# Patient Record
Sex: MALE | Race: WHITE | Employment: FULL TIME | URBAN - METROPOLITAN AREA
[De-identification: names, ages, dates, MRNs, and addresses within clinical notes are randomized per-mention and may not be internally consistent; named-entity substitution may affect disease eponyms.]

---

## 2017-05-24 ENCOUNTER — APPOINTMENT (OUTPATIENT)
Dept: CT IMAGING | Age: 62
End: 2017-05-24
Payer: COMMERCIAL

## 2017-05-24 ENCOUNTER — HOSPITAL ENCOUNTER (EMERGENCY)
Age: 62
Discharge: HOME | End: 2017-05-24
Attending: EMERGENCY MEDICINE
Payer: COMMERCIAL

## 2017-05-24 VITALS
BODY MASS INDEX: 34.53 KG/M2 | RESPIRATION RATE: 17 BRPM | OXYGEN SATURATION: 95 % | TEMPERATURE: 97.6 F | WEIGHT: 220 LBS | HEART RATE: 62 BPM | HEIGHT: 67 IN | DIASTOLIC BLOOD PRESSURE: 84 MMHG | SYSTOLIC BLOOD PRESSURE: 148 MMHG

## 2017-05-24 DIAGNOSIS — R10.9 FLANK PAIN: Primary | ICD-10-CM

## 2017-05-24 LAB
ANION GAP SERPL CALCULATED.3IONS-SCNC: 16 MEQ/L (ref 7–13)
BACTERIA: NORMAL /HPF
BASOPHILS ABSOLUTE: 0 K/UL (ref 0–0.2)
BASOPHILS RELATIVE PERCENT: 0.3 %
BILIRUBIN URINE: NEGATIVE
BLOOD, URINE: ABNORMAL
BUN BLDV-MCNC: 16 MG/DL (ref 6–20)
CALCIUM SERPL-MCNC: 9 MG/DL (ref 8.6–10.2)
CHLORIDE BLD-SCNC: 97 MEQ/L (ref 98–107)
CLARITY: CLEAR
CO2: 24 MEQ/L (ref 22–29)
COLOR: YELLOW
CREAT SERPL-MCNC: 1.09 MG/DL (ref 0.7–1.2)
EOSINOPHILS ABSOLUTE: 0.1 K/UL (ref 0–0.7)
EOSINOPHILS RELATIVE PERCENT: 0.7 %
EPITHELIAL CELLS, UA: NORMAL /HPF
GFR AFRICAN AMERICAN: >60
GFR NON-AFRICAN AMERICAN: >60
GLUCOSE BLD-MCNC: 176 MG/DL (ref 74–109)
GLUCOSE URINE: 100 MG/DL
HCT VFR BLD CALC: 50.1 % (ref 42–52)
HEMOGLOBIN: 17 G/DL (ref 14–18)
KETONES, URINE: NEGATIVE MG/DL
LEUKOCYTE ESTERASE, URINE: NEGATIVE
LYMPHOCYTES ABSOLUTE: 3.6 K/UL (ref 1–4.8)
LYMPHOCYTES RELATIVE PERCENT: 23.5 %
MCH RBC QN AUTO: 29 PG (ref 27–31.3)
MCHC RBC AUTO-ENTMCNC: 34 % (ref 33–37)
MCV RBC AUTO: 85.2 FL (ref 80–100)
MONOCYTES ABSOLUTE: 1.1 K/UL (ref 0.2–0.8)
MONOCYTES RELATIVE PERCENT: 7.2 %
NEUTROPHILS ABSOLUTE: 10.4 K/UL (ref 1.4–6.5)
NEUTROPHILS RELATIVE PERCENT: 68.3 %
NITRITE, URINE: NEGATIVE
PDW BLD-RTO: 13.3 % (ref 11.5–14.5)
PH UA: 7 (ref 5–9)
PLATELET # BLD: 233 K/UL (ref 130–400)
POTASSIUM SERPL-SCNC: 3.5 MEQ/L (ref 3.5–5.1)
PROTEIN UA: NEGATIVE MG/DL
RBC # BLD: 5.88 M/UL (ref 4.7–6.1)
RBC UA: NORMAL /HPF (ref 0–2)
SODIUM BLD-SCNC: 137 MEQ/L (ref 132–144)
SPECIFIC GRAVITY UA: 1.02 (ref 1–1.03)
UROBILINOGEN, URINE: 1 E.U./DL
WBC # BLD: 15.3 K/UL (ref 4.8–10.8)
WBC UA: NORMAL /HPF (ref 0–5)

## 2017-05-24 PROCEDURE — 96374 THER/PROPH/DIAG INJ IV PUSH: CPT

## 2017-05-24 PROCEDURE — 85025 COMPLETE CBC W/AUTO DIFF WBC: CPT

## 2017-05-24 PROCEDURE — 80048 BASIC METABOLIC PNL TOTAL CA: CPT

## 2017-05-24 PROCEDURE — 81001 URINALYSIS AUTO W/SCOPE: CPT

## 2017-05-24 PROCEDURE — 74176 CT ABD & PELVIS W/O CONTRAST: CPT

## 2017-05-24 PROCEDURE — 6360000002 HC RX W HCPCS: Performed by: EMERGENCY MEDICINE

## 2017-05-24 PROCEDURE — 99284 EMERGENCY DEPT VISIT MOD MDM: CPT

## 2017-05-24 PROCEDURE — 96375 TX/PRO/DX INJ NEW DRUG ADDON: CPT

## 2017-05-24 PROCEDURE — 2580000003 HC RX 258: Performed by: EMERGENCY MEDICINE

## 2017-05-24 RX ORDER — CYCLOBENZAPRINE HCL 10 MG
10 TABLET ORAL 3 TIMES DAILY PRN
Qty: 30 TABLET | Refills: 0 | Status: SHIPPED | OUTPATIENT
Start: 2017-05-24 | End: 2017-06-03

## 2017-05-24 RX ORDER — 0.9 % SODIUM CHLORIDE 0.9 %
1000 INTRAVENOUS SOLUTION INTRAVENOUS ONCE
Status: COMPLETED | OUTPATIENT
Start: 2017-05-24 | End: 2017-05-24

## 2017-05-24 RX ORDER — HYDROCODONE BITARTRATE AND ACETAMINOPHEN 5; 325 MG/1; MG/1
1 TABLET ORAL EVERY 6 HOURS PRN
Qty: 8 TABLET | Refills: 0 | Status: SHIPPED | OUTPATIENT
Start: 2017-05-24 | End: 2017-05-31

## 2017-05-24 RX ORDER — NAPROXEN 500 MG/1
500 TABLET ORAL 2 TIMES DAILY WITH MEALS
Qty: 10 TABLET | Refills: 0 | Status: SHIPPED | OUTPATIENT
Start: 2017-05-24

## 2017-05-24 RX ORDER — KETOROLAC TROMETHAMINE 30 MG/ML
30 INJECTION, SOLUTION INTRAMUSCULAR; INTRAVENOUS ONCE
Status: COMPLETED | OUTPATIENT
Start: 2017-05-24 | End: 2017-05-24

## 2017-05-24 RX ORDER — MORPHINE SULFATE 4 MG/ML
4 INJECTION, SOLUTION INTRAMUSCULAR; INTRAVENOUS
Status: DISCONTINUED | OUTPATIENT
Start: 2017-05-24 | End: 2017-05-24 | Stop reason: HOSPADM

## 2017-05-24 RX ORDER — ONDANSETRON 2 MG/ML
4 INJECTION INTRAMUSCULAR; INTRAVENOUS ONCE
Status: COMPLETED | OUTPATIENT
Start: 2017-05-24 | End: 2017-05-24

## 2017-05-24 RX ORDER — SODIUM CHLORIDE 9 MG/ML
INJECTION, SOLUTION INTRAVENOUS CONTINUOUS
Status: DISCONTINUED | OUTPATIENT
Start: 2017-05-24 | End: 2017-05-24 | Stop reason: HOSPADM

## 2017-05-24 RX ADMIN — MORPHINE SULFATE 4 MG: 4 INJECTION, SOLUTION INTRAMUSCULAR; INTRAVENOUS at 01:13

## 2017-05-24 RX ADMIN — SODIUM CHLORIDE 1000 ML: 9 INJECTION, SOLUTION INTRAVENOUS at 01:20

## 2017-05-24 RX ADMIN — KETOROLAC TROMETHAMINE 30 MG: 30 INJECTION, SOLUTION INTRAMUSCULAR at 01:13

## 2017-05-24 RX ADMIN — ONDANSETRON 4 MG: 2 INJECTION INTRAMUSCULAR; INTRAVENOUS at 01:13

## 2017-05-24 ASSESSMENT — ENCOUNTER SYMPTOMS
SHORTNESS OF BREATH: 0
COUGH: 0
TROUBLE SWALLOWING: 0
ABDOMINAL PAIN: 1
CHEST TIGHTNESS: 0
BLOOD IN STOOL: 0
DIARRHEA: 0
EYE PAIN: 0
RHINORRHEA: 0
NAUSEA: 0
VOMITING: 0
WHEEZING: 0
BACK PAIN: 0

## 2017-05-24 ASSESSMENT — PAIN DESCRIPTION - LOCATION: LOCATION: BACK

## 2017-05-24 ASSESSMENT — PAIN DESCRIPTION - PROGRESSION: CLINICAL_PROGRESSION: GRADUALLY WORSENING

## 2017-05-24 ASSESSMENT — PAIN DESCRIPTION - ORIENTATION: ORIENTATION: LOWER;RIGHT

## 2017-05-24 ASSESSMENT — PAIN DESCRIPTION - ONSET: ONSET: GRADUAL

## 2017-05-24 ASSESSMENT — PAIN DESCRIPTION - DESCRIPTORS: DESCRIPTORS: SHARP

## 2017-05-24 ASSESSMENT — PAIN SCALES - GENERAL
PAINLEVEL_OUTOF10: 0
PAINLEVEL_OUTOF10: 0
PAINLEVEL_OUTOF10: 10

## 2017-05-24 ASSESSMENT — PAIN DESCRIPTION - FREQUENCY: FREQUENCY: CONTINUOUS

## 2017-05-24 ASSESSMENT — PAIN DESCRIPTION - PAIN TYPE: TYPE: ACUTE PAIN

## 2023-02-27 LAB
ALANINE AMINOTRANSFERASE (SGPT) (U/L) IN SER/PLAS: 34 U/L (ref 10–52)
ALBUMIN (G/DL) IN SER/PLAS: 4.1 G/DL (ref 3.4–5)
ALKALINE PHOSPHATASE (U/L) IN SER/PLAS: 62 U/L (ref 33–136)
ANION GAP IN SER/PLAS: 13 MMOL/L (ref 10–20)
ASPARTATE AMINOTRANSFERASE (SGOT) (U/L) IN SER/PLAS: 28 U/L (ref 9–39)
BILIRUBIN TOTAL (MG/DL) IN SER/PLAS: 0.7 MG/DL (ref 0–1.2)
CALCIDIOL (25 OH VITAMIN D3) (NG/ML) IN SER/PLAS: 32 NG/ML
CALCIUM (MG/DL) IN SER/PLAS: 8.9 MG/DL (ref 8.6–10.3)
CARBON DIOXIDE, TOTAL (MMOL/L) IN SER/PLAS: 31 MMOL/L (ref 21–32)
CHLORIDE (MMOL/L) IN SER/PLAS: 101 MMOL/L (ref 98–107)
COBALAMIN (VITAMIN B12) (PG/ML) IN SER/PLAS: 318 PG/ML (ref 211–911)
CREATININE (MG/DL) IN SER/PLAS: 1.08 MG/DL (ref 0.5–1.3)
ERYTHROCYTE DISTRIBUTION WIDTH (RATIO) BY AUTOMATED COUNT: 13.2 % (ref 11.5–14.5)
ERYTHROCYTE MEAN CORPUSCULAR HEMOGLOBIN CONCENTRATION (G/DL) BY AUTOMATED: 33.6 G/DL (ref 32–36)
ERYTHROCYTE MEAN CORPUSCULAR VOLUME (FL) BY AUTOMATED COUNT: 84 FL (ref 80–100)
ERYTHROCYTES (10*6/UL) IN BLOOD BY AUTOMATED COUNT: 5.58 X10E12/L (ref 4.5–5.9)
GFR MALE: 76 ML/MIN/1.73M2
GLUCOSE (MG/DL) IN SER/PLAS: 87 MG/DL (ref 74–99)
HEMATOCRIT (%) IN BLOOD BY AUTOMATED COUNT: 47 % (ref 41–52)
HEMOGLOBIN (G/DL) IN BLOOD: 15.8 G/DL (ref 13.5–17.5)
IRON (UG/DL) IN SER/PLAS: 123 UG/DL (ref 35–150)
IRON BINDING CAPACITY (UG/DL) IN SER/PLAS: 373 UG/DL (ref 240–445)
IRON SATURATION (%) IN SER/PLAS: 33 % (ref 25–45)
LEUKOCYTES (10*3/UL) IN BLOOD BY AUTOMATED COUNT: 8.1 X10E9/L (ref 4.4–11.3)
PLATELETS (10*3/UL) IN BLOOD AUTOMATED COUNT: 221 X10E9/L (ref 150–450)
POTASSIUM (MMOL/L) IN SER/PLAS: 3.7 MMOL/L (ref 3.5–5.3)
PROSTATE SPECIFIC ANTIGEN,SCREEN: 1.67 NG/ML (ref 0–4)
PROTEIN TOTAL: 6.5 G/DL (ref 6.4–8.2)
SODIUM (MMOL/L) IN SER/PLAS: 141 MMOL/L (ref 136–145)
THYROTROPIN (MIU/L) IN SER/PLAS BY DETECTION LIMIT <= 0.05 MIU/L: 2.39 MIU/L (ref 0.44–3.98)
UREA NITROGEN (MG/DL) IN SER/PLAS: 17 MG/DL (ref 6–23)

## 2023-08-19 ENCOUNTER — TELEMEDICINE (OUTPATIENT)
Dept: PRIMARY CARE | Facility: CLINIC | Age: 68
End: 2023-08-19
Payer: COMMERCIAL

## 2023-08-19 DIAGNOSIS — J01.41 ACUTE RECURRENT PANSINUSITIS: Primary | ICD-10-CM

## 2023-08-19 DIAGNOSIS — R05.1 ACUTE COUGH: ICD-10-CM

## 2023-08-19 PROBLEM — G47.33 OBSTRUCTIVE SLEEP APNEA: Status: ACTIVE | Noted: 2023-08-19

## 2023-08-19 PROBLEM — I10 BENIGN ESSENTIAL HTN: Status: ACTIVE | Noted: 2023-08-19

## 2023-08-19 PROBLEM — E78.5 HYPERLIPIDEMIA: Status: ACTIVE | Noted: 2023-08-19

## 2023-08-19 PROBLEM — K20.90 ESOPHAGITIS: Status: ACTIVE | Noted: 2023-08-19

## 2023-08-19 PROCEDURE — 99213 OFFICE O/P EST LOW 20 MIN: CPT | Performed by: NURSE PRACTITIONER

## 2023-08-19 RX ORDER — GUAIFENESIN 1200 MG/1
1200 TABLET, EXTENDED RELEASE ORAL 2 TIMES DAILY PRN
COMMUNITY
Start: 2016-01-06 | End: 2023-10-03 | Stop reason: ALTCHOICE

## 2023-08-19 RX ORDER — ATORVASTATIN CALCIUM 20 MG/1
20 TABLET, FILM COATED ORAL DAILY
COMMUNITY
Start: 2019-02-19 | End: 2023-10-03 | Stop reason: SDUPTHER

## 2023-08-19 RX ORDER — FLUTICASONE PROPIONATE 50 MCG
1 SPRAY, SUSPENSION (ML) NASAL DAILY
COMMUNITY
Start: 2020-02-14

## 2023-08-19 RX ORDER — BENZONATATE 200 MG/1
200 CAPSULE ORAL 3 TIMES DAILY PRN
Qty: 42 CAPSULE | Refills: 0 | Status: SHIPPED | OUTPATIENT
Start: 2023-08-19 | End: 2023-09-18

## 2023-08-19 RX ORDER — HYDROCHLOROTHIAZIDE 25 MG/1
25 TABLET ORAL DAILY
COMMUNITY
Start: 2021-03-16 | End: 2023-10-03 | Stop reason: SDUPTHER

## 2023-08-19 RX ORDER — AMOXICILLIN AND CLAVULANATE POTASSIUM 875; 125 MG/1; MG/1
1 TABLET, FILM COATED ORAL 2 TIMES DAILY
Qty: 14 TABLET | Refills: 0 | Status: SHIPPED | OUTPATIENT
Start: 2023-08-19 | End: 2023-08-26

## 2023-08-19 RX ORDER — PANTOPRAZOLE SODIUM 40 MG/1
40 TABLET, DELAYED RELEASE ORAL DAILY PRN
COMMUNITY
Start: 2014-05-15 | End: 2023-10-03 | Stop reason: SDUPTHER

## 2023-08-19 ASSESSMENT — ENCOUNTER SYMPTOMS
HEADACHES: 1
ARTHRALGIAS: 0
VOMITING: 0
DIAPHORESIS: 0
COUGH: 1
ACTIVITY CHANGE: 0
CHILLS: 1
FATIGUE: 1
FEVER: 0
MYALGIAS: 1
DIZZINESS: 0
NECK PAIN: 0
WHEEZING: 0
NAUSEA: 0
SINUS PAIN: 1
CHEST TIGHTNESS: 0
SORE THROAT: 0
LIGHT-HEADEDNESS: 0
ABDOMINAL PAIN: 0

## 2023-08-19 NOTE — PROGRESS NOTES
"Subjective   Patient ID: Fredy Davidson is a 67 y.o. male who presents for URI.    Sx onset 1 week ago  Sinus drainage, coughing, body aches  States he gets sinus infections 2-3 times a year  Unable to sleep at night due to coughing  Sinus pressure/pain face under eyes  OTC treatments: Zyrtec, Nyquil, Flonase and mucinex  Denies: fever (has not taken temperature)  Has busy week of work next week, \"can't be sick\"        URI   This is a new problem. The current episode started 1 to 4 weeks ago. The problem has been gradually worsening. There has been no fever. Associated symptoms include congestion, coughing, headaches and sinus pain. Pertinent negatives include no abdominal pain, chest pain, ear pain, nausea, neck pain, rash, sneezing, sore throat, vomiting or wheezing. He has tried antihistamine, decongestant and NSAIDs for the symptoms. The treatment provided mild relief.        Review of Systems   Constitutional:  Positive for chills and fatigue. Negative for activity change, diaphoresis and fever.   HENT:  Positive for congestion, postnasal drip and sinus pain. Negative for ear pain, sneezing and sore throat.    Respiratory:  Positive for cough. Negative for chest tightness and wheezing.    Cardiovascular:  Negative for chest pain.   Gastrointestinal:  Negative for abdominal pain, nausea and vomiting.   Musculoskeletal:  Positive for myalgias. Negative for arthralgias and neck pain.   Skin:  Negative for rash.   Neurological:  Positive for headaches. Negative for dizziness and light-headedness.       Objective   There were no vitals taken for this visit.    Physical Exam  Constitutional:       General: He is not in acute distress.     Appearance: Normal appearance. He is normal weight.      Comments: Unable to perform complete physical exam due to virtual visit, patient was visualized on interactive video.    Pulmonary:      Effort: Pulmonary effort is normal.   Neurological:      Mental Status: He is alert and " oriented to person, place, and time.         Assessment/Plan   Diagnoses and all orders for this visit:  Acute recurrent pansinusitis  -     amoxicillin-pot clavulanate (Augmentin) 875-125 mg tablet; Take 1 tablet (875 mg) by mouth 2 times a day for 7 days.  -     benzonatate (Tessalon) 200 mg capsule; Take 1 capsule (200 mg) by mouth 3 times a day as needed for cough. Do not crush or chew.  -Advised to continue on Zyrtec and Flonase nasal spray daily  -Complete entire coarse of antibiotics  Acute cough  -     benzonatate (Tessalon) 200 mg capsule; Take 1 capsule (200 mg) by mouth 3 times a day as needed for cough. Do not crush or chew.

## 2023-09-25 ENCOUNTER — TELEPHONE (OUTPATIENT)
Dept: PRIMARY CARE | Facility: CLINIC | Age: 68
End: 2023-09-25
Payer: COMMERCIAL

## 2023-09-25 DIAGNOSIS — R73.9 ELEVATED BLOOD SUGAR: ICD-10-CM

## 2023-09-25 DIAGNOSIS — E78.00 PURE HYPERCHOLESTEROLEMIA: Primary | ICD-10-CM

## 2023-09-26 ENCOUNTER — LAB (OUTPATIENT)
Dept: LAB | Facility: LAB | Age: 68
End: 2023-09-26
Payer: COMMERCIAL

## 2023-09-26 DIAGNOSIS — R73.9 ELEVATED BLOOD SUGAR: ICD-10-CM

## 2023-09-26 DIAGNOSIS — E78.00 PURE HYPERCHOLESTEROLEMIA: ICD-10-CM

## 2023-09-26 LAB
ALBUMIN (G/DL) IN SER/PLAS: 4.1 G/DL (ref 3.4–5)
ANION GAP IN SER/PLAS: 11 MMOL/L (ref 10–20)
CALCIUM (MG/DL) IN SER/PLAS: 8.8 MG/DL (ref 8.6–10.3)
CARBON DIOXIDE, TOTAL (MMOL/L) IN SER/PLAS: 30 MMOL/L (ref 21–32)
CHLORIDE (MMOL/L) IN SER/PLAS: 98 MMOL/L (ref 98–107)
CHOLESTEROL (MG/DL) IN SER/PLAS: 174 MG/DL (ref 0–199)
CHOLESTEROL IN HDL (MG/DL) IN SER/PLAS: 42.6 MG/DL
CHOLESTEROL/HDL RATIO: 4.1
CREATININE (MG/DL) IN SER/PLAS: 1.3 MG/DL (ref 0.5–1.3)
ESTIMATED AVERAGE GLUCOSE FOR HBA1C: 105 MG/DL
GFR MALE: 61 ML/MIN/1.73M2
GLUCOSE (MG/DL) IN SER/PLAS: 91 MG/DL (ref 74–99)
HEMOGLOBIN A1C/HEMOGLOBIN TOTAL IN BLOOD: 5.3 %
LDL: 113 MG/DL (ref 0–99)
PHOSPHATE (MG/DL) IN SER/PLAS: 3.6 MG/DL (ref 2.5–4.9)
POTASSIUM (MMOL/L) IN SER/PLAS: 3.3 MMOL/L (ref 3.5–5.3)
SODIUM (MMOL/L) IN SER/PLAS: 136 MMOL/L (ref 136–145)
TRIGLYCERIDE (MG/DL) IN SER/PLAS: 91 MG/DL (ref 0–149)
UREA NITROGEN (MG/DL) IN SER/PLAS: 15 MG/DL (ref 6–23)
VLDL: 18 MG/DL (ref 0–40)

## 2023-09-26 PROCEDURE — 80061 LIPID PANEL: CPT

## 2023-09-26 PROCEDURE — 83036 HEMOGLOBIN GLYCOSYLATED A1C: CPT

## 2023-09-26 PROCEDURE — 36415 COLL VENOUS BLD VENIPUNCTURE: CPT

## 2023-09-26 PROCEDURE — 80069 RENAL FUNCTION PANEL: CPT

## 2023-10-03 ENCOUNTER — OFFICE VISIT (OUTPATIENT)
Dept: PRIMARY CARE | Facility: CLINIC | Age: 68
End: 2023-10-03
Payer: COMMERCIAL

## 2023-10-03 VITALS
HEART RATE: 70 BPM | WEIGHT: 238 LBS | HEIGHT: 67 IN | DIASTOLIC BLOOD PRESSURE: 74 MMHG | OXYGEN SATURATION: 96 % | SYSTOLIC BLOOD PRESSURE: 137 MMHG | BODY MASS INDEX: 37.35 KG/M2

## 2023-10-03 DIAGNOSIS — E78.00 PURE HYPERCHOLESTEROLEMIA: ICD-10-CM

## 2023-10-03 DIAGNOSIS — G89.29 CHRONIC LEFT SHOULDER PAIN: ICD-10-CM

## 2023-10-03 DIAGNOSIS — G89.29 CHRONIC PAIN OF LEFT KNEE: ICD-10-CM

## 2023-10-03 DIAGNOSIS — I10 BENIGN ESSENTIAL HTN: ICD-10-CM

## 2023-10-03 DIAGNOSIS — M25.562 CHRONIC PAIN OF LEFT KNEE: ICD-10-CM

## 2023-10-03 DIAGNOSIS — G47.33 OBSTRUCTIVE SLEEP APNEA: ICD-10-CM

## 2023-10-03 DIAGNOSIS — M25.512 CHRONIC LEFT SHOULDER PAIN: ICD-10-CM

## 2023-10-03 DIAGNOSIS — Z12.5 ENCOUNTER FOR PROSTATE CANCER SCREENING: ICD-10-CM

## 2023-10-03 DIAGNOSIS — Z23 ENCOUNTER FOR IMMUNIZATION: ICD-10-CM

## 2023-10-03 DIAGNOSIS — K20.90 ESOPHAGITIS: ICD-10-CM

## 2023-10-03 DIAGNOSIS — E55.9 VITAMIN D DEFICIENCY: ICD-10-CM

## 2023-10-03 DIAGNOSIS — E66.01 CLASS 2 SEVERE OBESITY DUE TO EXCESS CALORIES WITH SERIOUS COMORBIDITY AND BODY MASS INDEX (BMI) OF 37.0 TO 37.9 IN ADULT (MULTI): ICD-10-CM

## 2023-10-03 DIAGNOSIS — Z00.00 ANNUAL PHYSICAL EXAM: Primary | ICD-10-CM

## 2023-10-03 PROCEDURE — 3008F BODY MASS INDEX DOCD: CPT | Performed by: FAMILY MEDICINE

## 2023-10-03 PROCEDURE — 99397 PER PM REEVAL EST PAT 65+ YR: CPT | Performed by: FAMILY MEDICINE

## 2023-10-03 PROCEDURE — 3078F DIAST BP <80 MM HG: CPT | Performed by: FAMILY MEDICINE

## 2023-10-03 PROCEDURE — 1036F TOBACCO NON-USER: CPT | Performed by: FAMILY MEDICINE

## 2023-10-03 PROCEDURE — 1160F RVW MEDS BY RX/DR IN RCRD: CPT | Performed by: FAMILY MEDICINE

## 2023-10-03 PROCEDURE — 1126F AMNT PAIN NOTED NONE PRSNT: CPT | Performed by: FAMILY MEDICINE

## 2023-10-03 PROCEDURE — 3075F SYST BP GE 130 - 139MM HG: CPT | Performed by: FAMILY MEDICINE

## 2023-10-03 PROCEDURE — 90662 IIV NO PRSV INCREASED AG IM: CPT | Performed by: FAMILY MEDICINE

## 2023-10-03 PROCEDURE — 90471 IMMUNIZATION ADMIN: CPT | Performed by: FAMILY MEDICINE

## 2023-10-03 PROCEDURE — 1159F MED LIST DOCD IN RCRD: CPT | Performed by: FAMILY MEDICINE

## 2023-10-03 RX ORDER — PANTOPRAZOLE SODIUM 40 MG/1
40 TABLET, DELAYED RELEASE ORAL DAILY PRN
Qty: 90 TABLET | Refills: 1 | Status: SHIPPED | OUTPATIENT
Start: 2023-10-03 | End: 2024-03-28

## 2023-10-03 RX ORDER — POTASSIUM CITRATE 10 MEQ/1
10 TABLET, EXTENDED RELEASE ORAL DAILY
Qty: 90 TABLET | Refills: 1 | Status: SHIPPED | OUTPATIENT
Start: 2023-10-03 | End: 2024-02-19 | Stop reason: WASHOUT

## 2023-10-03 RX ORDER — POTASSIUM CITRATE 10 MEQ/1
10 TABLET, EXTENDED RELEASE ORAL DAILY
COMMUNITY
Start: 2022-10-26 | End: 2023-10-03 | Stop reason: SDUPTHER

## 2023-10-03 RX ORDER — HYDROCHLOROTHIAZIDE 25 MG/1
25 TABLET ORAL DAILY
Qty: 90 TABLET | Refills: 1 | Status: SHIPPED | OUTPATIENT
Start: 2023-10-03 | End: 2024-05-28 | Stop reason: SDUPTHER

## 2023-10-03 RX ORDER — ATORVASTATIN CALCIUM 20 MG/1
20 TABLET, FILM COATED ORAL
Qty: 65 TABLET | Refills: 1 | Status: SHIPPED | OUTPATIENT
Start: 2023-10-03 | End: 2024-05-28 | Stop reason: SDUPTHER

## 2023-10-03 ASSESSMENT — COLUMBIA-SUICIDE SEVERITY RATING SCALE - C-SSRS
2. HAVE YOU ACTUALLY HAD ANY THOUGHTS OF KILLING YOURSELF?: NO
1. IN THE PAST MONTH, HAVE YOU WISHED YOU WERE DEAD OR WISHED YOU COULD GO TO SLEEP AND NOT WAKE UP?: NO

## 2023-10-03 ASSESSMENT — PATIENT HEALTH QUESTIONNAIRE - PHQ9
2. FEELING DOWN, DEPRESSED OR HOPELESS: NOT AT ALL
SUM OF ALL RESPONSES TO PHQ9 QUESTIONS 1 AND 2: 0
1. LITTLE INTEREST OR PLEASURE IN DOING THINGS: NOT AT ALL

## 2023-10-03 ASSESSMENT — ENCOUNTER SYMPTOMS
DEPRESSION: 0
OCCASIONAL FEELINGS OF UNSTEADINESS: 0
LOSS OF SENSATION IN FEET: 0

## 2023-10-03 NOTE — PROGRESS NOTES
"Subjective   Patient ID: Fredy Davidson is a 68 y.o. male who presents for Annual Exam.    Last Annual Physical: Oct 2022  Last Dental Visit: Up-to-date  Last Eye exam: Up-to-date  Hearing Concerns: Slight decrease in hearing  Diet: Well- balanced  Exercise Routine: Regular walking      HPI   The patient reports that he is taking atorvastatin for hyperlipemia however, he does not take it daily due to leg cramps as a side effect.  His blood pressures are well- controlled on the current dose of hydrochlorothiazide and is also taking pantoprazole as needed for GERD and potassium supplements for hypokalemia as prescribed. He complains of issues with his left shoulder and knee pain and is interested in getting injections.    Review of Systems  Constitutional: No fever or chills, No Night Sweats  Eyes: No Blurry Vision or Eye sight problems  ENT: No Nasal Discharge, Hoarseness, sore throat  Cardiovascular: no chest pain, no palpitations and no syncope.   Respiratory: no cough, no shortness of breath during exertion and no shortness of breath at rest.   Gastrointestinal: no abdominal pain, no nausea and no vomiting.   : No issues with urinary stream, burning with urination, no blood in urine or stools  Skin: No Skin rashes or Lesions  Neuro: No Headache, no dizziness or Numbness or tingling  Psych: No Anxiety, depression or sleeping problems  Heme: No Easy bleeding or brusing.   MSK: + left shoulder and left knee pain    Objective   /74   Pulse 70   Ht 1.702 m (5' 7\")   Wt 108 kg (238 lb)   SpO2 96%   BMI 37.28 kg/m²     Physical Exam  Constitutional: Alert and in no acute distress. Well developed, well nourished.   Head and Face: Head and face: Normal.    Eyes: Normal external exam. Pupils were equal in size, round, reactive to light (PERRL) with normal accommodation and extraocular movements intact (EOMI).   Ears, Nose, Mouth, and Throat: External inspection of ears and nose: Normal.  Hearing: Normal.  " Nasal mucosa, septum, and turbinates: Normal.  Lips, teeth, and gums: Normal.  Oropharynx: Normal.   Neck: No neck mass was observed. Supple. Thyroid not enlarged and there were no palpable thyroid nodules.   Cardiovascular: Heart rate and rhythm were normal, normal S1 and S2. Pedal pulses: Normal. No peripheral edema.   Pulmonary: No respiratory distress. Clear bilateral breath sounds.   Abdomen: Soft nontender; no abdominal mass palpated. Normal bowel sounds. No organomegaly.   Musculoskeletal: No joint swelling seen, normal movements of all extremities. Range of motion: Normal.  Muscle strength/tone: Normal.    Skin: Normal skin color and pigmentation, normal skin turgor, and no rash.   Neurologic: Deep tendon reflexes were 2+ and symmetric.   Psychiatric: Judgment and insight: Intact. Mood and affect: Normal.  Lymphatic: No cervical lymphadenopathy. Palpation of lymph nodes in axillae: Normal.  Palpation of lymph nodes in groin: Normal.    Lab Results   Component Value Date    WBC 8.1 02/27/2023    HGB 15.8 02/27/2023    HCT 47.0 02/27/2023     02/27/2023    CHOL 174 09/26/2023    TRIG 91 09/26/2023    HDL 42.6 09/26/2023    ALT 34 02/27/2023    AST 28 02/27/2023     09/26/2023    K 3.3 (L) 09/26/2023    CL 98 09/26/2023    CREATININE 1.30 09/26/2023    BUN 15 09/26/2023    CO2 30 09/26/2023    TSH 2.39 02/27/2023    HGBA1C 5.3 09/26/2023           Assessment/Plan   Diagnoses and all orders for this visit:  Annual physical exam  -     CBC; Future  -     Comprehensive Metabolic Panel; Future  -     TSH with reflex to Free T4 if abnormal; Future  Benign essential HTN  -     potassium citrate CR (Urocit-K-10) 10 mEq ER tablet; Take 1 tablet (10 mEq) by mouth once daily.  -     hydroCHLOROthiazide (HYDRODiuril) 25 mg tablet; Take 1 tablet (25 mg) by mouth once daily.  -     Albumin , Urine Random; Future  -     Follow Up In [facility] - PCP - Established; Future  Pure hypercholesterolemia  -      atorvastatin (Lipitor) 20 mg tablet; Take 1 tablet (20 mg) by mouth once daily (M-F).  -     Lipid Panel; Future  Obstructive sleep apnea  Encounter for immunization  -     Flu vaccine, quadrivalent, high-dose, preservative free, age 65y+ (FLUZONE)  Class 2 severe obesity due to excess calories with serious comorbidity and body mass index (BMI) of 37.0 to 37.9 in adult (CMS/[facility])  -     Hemoglobin A1C; Future  Esophagitis  -     pantoprazole (Protonix) 40 mg EC tablet; Take 1 tablet (40 mg) by mouth once daily as needed (heartburn).  Vitamin D deficiency  -     Vitamin B12; Future  -     Vitamin D 25-Hydroxy,Total (for eval of Vitamin D levels); Future  Encounter for prostate cancer screening  -     Prostate Specific Antigen, Screen; Future  Chronic left shoulder pain  -     XR shoulder left 2+ views; Future  Chronic pain of left knee  -     Referral to Physical Therapy; Future        Dear Fredy Davidson     It was my pleasure to take care of you today in the office. Below are the things we discussed today:    1. Immunizations: Yearly Flu shot is recommended. Given today         a: COVID: Please get booster from the pharmacy         b: Tetanus: Up-to-date         c: Shingrix: Up-to-date         d: Prevnar: Up-to-date    2. Blood Work: Reviewed  3. Seen your dentist twice a year  4. Yearly Eye exam is recommended    5. BMI: Obese  6: Diet recommendations:   Eat Clean, Try to have as many home cooked meals as possible  Avoid processed foods which contain excess calories, sugar, and sodium.    7. Exercise recommendations:   150 minutes a week to maintain your weight     If you have to loose weight, you need a better diet and exercise plan.     8. Please get your Living will / Advance directive completed if you do not have one already. Please make sure our office has a copy of the latest one.     9. Colonoscopy: Uptodate. Last done in May 2022, repeat in May 2027  10. PSA: Up-to-date    11. Hyperlipidemia: Continue  atorvastatin, take 4-5 times weekly.    12. Hypertension: Well- controlled. Continue hydrochlorothiazide.    13. GERD: Continue pantoprazole, use as needed.    14. Hypokalemia: Continue potassium supplements.    15. Left knee pain: Use over-the-counter Voltaren gel and work on losing weight. Physical therapy referral.    16. Left shoulder pain: Sports medicine referral. Left should x-ray ordered. Physical therapy referral.    Follow up in 6 months.     Follow up in one year for a Physical. Please call the office before your Physical to see if you need blood work completed prior to your physical.     Please call me if any questions arise from now until your next visit. I will call you after I am done seeing patients. A Doctor is always available by phone when the office is closed. Please feel free to call for help with any problem that you feel shouldn't wait until the office re-opens.     Scribe Attestation  By signing my name below, IYoana Scribe   attest that this documentation has been prepared under the direction and in the presence of Irina Michel MD.

## 2023-10-16 ENCOUNTER — TELEMEDICINE (OUTPATIENT)
Dept: PRIMARY CARE | Facility: CLINIC | Age: 68
End: 2023-10-16
Payer: COMMERCIAL

## 2023-10-16 DIAGNOSIS — J01.00 ACUTE NON-RECURRENT MAXILLARY SINUSITIS: Primary | ICD-10-CM

## 2023-10-16 PROCEDURE — 99213 OFFICE O/P EST LOW 20 MIN: CPT | Performed by: FAMILY MEDICINE

## 2023-10-16 RX ORDER — AMOXICILLIN AND CLAVULANATE POTASSIUM 875; 125 MG/1; MG/1
875 TABLET, FILM COATED ORAL 2 TIMES DAILY
Qty: 14 TABLET | Refills: 0 | Status: SHIPPED | OUTPATIENT
Start: 2023-10-16 | End: 2023-10-23

## 2023-10-16 NOTE — PROGRESS NOTES
Subjective   Patient ID: Fredy Davidson is a 68 y.o. male who presents for Hyperlipidemia.    HPI   The patient reports that his symptoms started 1 week ago with post- nasal drip. He has tried treating it with Tylenol, Zyrtec and Flonase nasal spray. Currently, he is experiencing a cough along with post-nasal drip. He mentions that his symptoms have been getting progressively worse.     Review of Systems:  Constitutional: No fever or chills  ENT: + post- nasal drip   Cardiovascular: no chest pain, no palpitations and no syncope.   Respiratory: + cough, no shortness of breath during exertion and no shortness of breath at rest.   Gastrointestinal: no abdominal pain, no nausea and no vomiting.  Neuro: No Headache, no dizziness    Physical Exam:   Constitutional: Alert and in no acute distress. Well developed, well nourished.   Head and Face: Head and face: Normal.     Eyes: Normal external exam.    Ears, Nose, Mouth, and Throat: External inspection of ears and nose: Normal.  Hearing: Normal.   Neck: No neck mass was observed. Supple.  Pulmonary: No respiratory distress.    Musculoskeletal: Range of motion: Normal.     Skin: Normal skin color and pigmentation, normal skin turgor, and no rash.    Neurologic: Coordination: Normal.    Psychiatric: Judgment and insight: Intact. Mood and affect: Normal.    Lab Results   Component Value Date    WBC 8.1 02/27/2023    HGB 15.8 02/27/2023    HCT 47.0 02/27/2023     02/27/2023    CHOL 174 09/26/2023    TRIG 91 09/26/2023    HDL 42.6 09/26/2023    ALT 34 02/27/2023    AST 28 02/27/2023     09/26/2023    K 3.3 (L) 09/26/2023    CL 98 09/26/2023    CREATININE 1.30 09/26/2023    BUN 15 09/26/2023    CO2 30 09/26/2023    TSH 2.39 02/27/2023    HGBA1C 5.3 09/26/2023           Assessment/Plan   Diagnoses and all orders for this visit:  Acute non-recurrent maxillary sinusitis  -     amoxicillin-pot clavulanate (Augmentin) 875-125 mg tablet; Take 1 tablet (875 mg) by mouth  2 times a day for 7 days.      1. Sinusitis: Start Augmentin. Continue Flonase nasal spray and Zyrtec.     This Medical recommendation has been made based on the Telephonic/Video conversation and the history is given by the patient, which I as a Physician and the patient also understand that there are limitations given the lack of an in-person Physical Exam. Patient will call back if symptoms don't improve.    Follow up in 6 months    Your yearly Physical is due in: Oct 2024   When you call the office for your yearly Physical, please ask them to inform me to order your blood work, so that you can get the fasting blood work before your appointment and we can discuss the results at your physical.      A Doctor is always available by phone when the office is closed. Please feel free to call for help with any problem that you feel shouldn't wait until the office re-opens.     Scribe Attestation  By signing my name below, IYoana, Scribmynor   attest that this documentation has been prepared under the direction and in the presence of Irina Michel MD.

## 2023-10-18 ENCOUNTER — PATIENT MESSAGE (OUTPATIENT)
Dept: PRIMARY CARE | Facility: CLINIC | Age: 68
End: 2023-10-18
Payer: COMMERCIAL

## 2023-10-18 DIAGNOSIS — J01.00 ACUTE NON-RECURRENT MAXILLARY SINUSITIS: Primary | ICD-10-CM

## 2023-10-18 RX ORDER — PROMETHAZINE HYDROCHLORIDE AND DEXTROMETHORPHAN HYDROBROMIDE 6.25; 15 MG/5ML; MG/5ML
5 SYRUP ORAL 4 TIMES DAILY PRN
Qty: 118 ML | Refills: 0 | Status: SHIPPED | OUTPATIENT
Start: 2023-10-18 | End: 2024-02-19 | Stop reason: WASHOUT

## 2023-11-17 ENCOUNTER — PATIENT MESSAGE (OUTPATIENT)
Dept: PRIMARY CARE | Facility: CLINIC | Age: 68
End: 2023-11-17
Payer: COMMERCIAL

## 2023-11-17 DIAGNOSIS — J01.41 ACUTE RECURRENT PANSINUSITIS: Primary | ICD-10-CM

## 2023-11-18 RX ORDER — AZITHROMYCIN 250 MG/1
TABLET, FILM COATED ORAL
Qty: 6 TABLET | Refills: 0 | Status: SHIPPED | OUTPATIENT
Start: 2023-11-18 | End: 2023-11-23

## 2024-01-10 ENCOUNTER — OFFICE VISIT (OUTPATIENT)
Dept: PRIMARY CARE | Facility: CLINIC | Age: 69
End: 2024-01-10
Payer: COMMERCIAL

## 2024-01-10 ENCOUNTER — LAB (OUTPATIENT)
Dept: LAB | Facility: LAB | Age: 69
End: 2024-01-10
Payer: COMMERCIAL

## 2024-01-10 VITALS
OXYGEN SATURATION: 96 % | BODY MASS INDEX: 37.35 KG/M2 | WEIGHT: 238 LBS | SYSTOLIC BLOOD PRESSURE: 167 MMHG | DIASTOLIC BLOOD PRESSURE: 94 MMHG | HEIGHT: 67 IN | HEART RATE: 72 BPM

## 2024-01-10 DIAGNOSIS — E78.00 PURE HYPERCHOLESTEROLEMIA: ICD-10-CM

## 2024-01-10 DIAGNOSIS — G47.33 OBSTRUCTIVE SLEEP APNEA: Primary | ICD-10-CM

## 2024-01-10 DIAGNOSIS — R06.02 SOB (SHORTNESS OF BREATH) ON EXERTION: ICD-10-CM

## 2024-01-10 DIAGNOSIS — R53.83 FATIGUE, UNSPECIFIED TYPE: ICD-10-CM

## 2024-01-10 DIAGNOSIS — I10 BENIGN ESSENTIAL HTN: ICD-10-CM

## 2024-01-10 LAB — BNP SERPL-MCNC: 5 PG/ML (ref 0–99)

## 2024-01-10 PROCEDURE — 85025 COMPLETE CBC W/AUTO DIFF WBC: CPT

## 2024-01-10 PROCEDURE — 86431 RHEUMATOID FACTOR QUANT: CPT

## 2024-01-10 PROCEDURE — 86235 NUCLEAR ANTIGEN ANTIBODY: CPT

## 2024-01-10 PROCEDURE — 3008F BODY MASS INDEX DOCD: CPT | Performed by: FAMILY MEDICINE

## 2024-01-10 PROCEDURE — 3080F DIAST BP >= 90 MM HG: CPT | Performed by: FAMILY MEDICINE

## 2024-01-10 PROCEDURE — 85652 RBC SED RATE AUTOMATED: CPT

## 2024-01-10 PROCEDURE — 86200 CCP ANTIBODY: CPT

## 2024-01-10 PROCEDURE — 99214 OFFICE O/P EST MOD 30 MIN: CPT | Performed by: FAMILY MEDICINE

## 2024-01-10 PROCEDURE — 1126F AMNT PAIN NOTED NONE PRSNT: CPT | Performed by: FAMILY MEDICINE

## 2024-01-10 PROCEDURE — 1160F RVW MEDS BY RX/DR IN RCRD: CPT | Performed by: FAMILY MEDICINE

## 2024-01-10 PROCEDURE — 86225 DNA ANTIBODY NATIVE: CPT

## 2024-01-10 PROCEDURE — 83880 ASSAY OF NATRIURETIC PEPTIDE: CPT

## 2024-01-10 PROCEDURE — 80053 COMPREHEN METABOLIC PANEL: CPT

## 2024-01-10 PROCEDURE — 86038 ANTINUCLEAR ANTIBODIES: CPT

## 2024-01-10 PROCEDURE — 36415 COLL VENOUS BLD VENIPUNCTURE: CPT

## 2024-01-10 PROCEDURE — 3077F SYST BP >= 140 MM HG: CPT | Performed by: FAMILY MEDICINE

## 2024-01-10 PROCEDURE — 1036F TOBACCO NON-USER: CPT | Performed by: FAMILY MEDICINE

## 2024-01-10 PROCEDURE — 1159F MED LIST DOCD IN RCRD: CPT | Performed by: FAMILY MEDICINE

## 2024-01-10 PROCEDURE — 86140 C-REACTIVE PROTEIN: CPT

## 2024-01-10 NOTE — PROGRESS NOTES
"Subjective   Patient ID: Margarito Valverde is a 68 y.o. male who presents for Fatigue (Stress & Anxiety).    HPI   The patient reports that his symptoms started 2 weeks ago with fatigue, snoring, shortness-of-breath and sleep issues. He states that he has a CPAP machine for his sleep apnea but does not use it regularly. He mentions that these symptoms have caused him significant anxiety.    Review of Systems  Constitutional: + fatigue. No fever or chills  Cardiovascular: no chest pain, no palpitations and no syncope.   Respiratory: + shortness of breath on exertion. no cough, no shortness of breath during exertion   Gastrointestinal: no abdominal pain, no nausea and no vomiting.  Neuro: No Headache, no dizziness  Psych: + sleep issues.    Objective   BP (!) 167/94   Pulse 72   Ht 1.702 m (5' 7\")   Wt 108 kg (238 lb)   SpO2 96%   BMI 37.28 kg/m²     Physical Exam  Constitutional: Alert and in no acute distress. Well developed, well nourished  Head and Face: Head and face: Normal.    Cardiovascular: Heart rate and rhythm were normal, normal S1 and S2. No peripheral edema.   Pulmonary: No respiratory distress. Clear bilateral breath sounds.  Musculoskeletal: Gait and station: Normal. Muscle strength/tone: Normal.   Skin: Normal skin color and pigmentation, normal skin turgor, and no rash.    Psychiatric: Judgment and insight: Intact. Mood and affect: Normal.    Lab Results   Component Value Date    WBC 8.1 02/27/2023    HGB 15.8 02/27/2023    HCT 47.0 02/27/2023     02/27/2023    CHOL 174 09/26/2023    TRIG 91 09/26/2023    HDL 42.6 09/26/2023    ALT 34 02/27/2023    AST 28 02/27/2023     09/26/2023    K 3.3 (L) 09/26/2023    CL 98 09/26/2023    CREATININE 1.30 09/26/2023    BUN 15 09/26/2023    CO2 30 09/26/2023    TSH 2.39 02/27/2023    HGBA1C 5.3 09/26/2023       CT cardiac scoring wo IV contrast  Narrative: Interpreted By:  STEVE SANTAMARIA MD  MRN: [id number]  Patient Name: MARGARITO VALVERDE   "   STUDY:  CT CARDIAC SCORING;  5/2/2023 2:32 pm     INDICATION:  Hyperlipidemia  E78.5: Hyperlipidemia.     COMPARISON:  Cardiac scoring CT dated 06/19/2017     ACCESSION NUMBER(S):  [id number]     ORDERING CLINICIAN:  GABINO BARROW     TECHNIQUE:  Using prospective ECG gating, CT scan of the coronary arteries was  performed without intravenous contrast. Coronary calcium scoring  was  performed according to the method of Agatston.     FINDINGS:  The score and distribution of calcium in the coronary arteries is as  follows:     Of note there is motion artifact on this examination, including  through the coronary arteries which may affect the scored values.     Left Main Coronary: 1.27.  Left Anterior Descending: 10.89. The score on this artery on the  prior exam was 18.54  Left Circumflex: 0.  Right Coronary Artery: 0. Of note the score of this artery was 1.47  on the prior exam. It is possible that this region is obscured on the  current exam.     Total: 12.16. The total score on the prior exam was 20.01     The lungs are clear.     The aorta is without aneurysmal dilatation evident.     The heart is not enlarged. No pericardial effusion is evident.     No hilar or mediastinal lymphadenopathy is evident.     There is a small hiatal hernia.     Impression: 1. Coronary artery calcium score of 12.16*. It is possible that this  score is not fully accurate due to patient motion on the examination.  Of note the overall risk category is the same as the prior exam.  2. Small hiatal hernia.     *Coronary artery calcium scoring may be helpful in predicting the  risk for future coronary heart disease events.  According to the  [employer] College [employer] Foundation Clinical Expert Consensus  Task Force, such testing provides important prognostic information in  patients with more than one coronary heart disease risk factor. The  coronary artery calcium score correlates with the annual risk of a  non-fatal myocardial infarction  "or coronary heart disease death.     Coronary artery score            Annual Risk     0-99                             0.4%  100-399                        1.3%  >400                            2.4%     These three \"breakpoints\" correspond to lower, intermediate and high  risk states for future coronary events.  Such information should be  used, along with appropriate clinical judgment, to make decisions  regarding the intensity of risk factor management strategies to treat  blood lipids and to modify other non-lipid coronary risk factors.     Reference: Coleman P et al. Circulation.  2007; 115:402-426      Assessment/Plan   Diagnoses and all orders for this visit:  Obstructive sleep apnea  Pure hypercholesterolemia  Benign essential HTN  SOB (shortness of breath) on exertion  -     Echocardiogram stress test; Future  -     B-Type Natriuretic Peptide; Future  Fatigue, unspecified type  -     CBC and Auto Differential; Future  -     Comprehensive Metabolic Panel; Future  -     EJFF with Reflex to EMILE; Future  -     Citrulline Antibody, IgG; Future  -     C-Reactive Protein; Future  -     Rheumatoid Factor; Future  -     Sedimentation Rate; Future        Dear Fredy Davidson     It was my pleasure to take care of you today in the office. Below are the things we discussed today:    1. Shortness-of-breath on exertion: Echocardiogram stress test ordered.    2. Fatigue: Blood work ordered.    Follow up in 3 months    Your yearly Physical is due in: Oct 2024   When you call the office for your yearly Physical, please ask them to inform me to order your blood work, so that you can get the fasting blood work before your appointment and we can discuss the results at your physical.      Please call me if any questions arise from now until your next visit. I will call you after I am done seeing patients. A Doctor is always available by phone when the office is closed. Please feel free to call for help with any problem that " you feel shouldn't wait until the office re-opens.     Scribe Attestation  By signing my name below, I, Yoana Orr, Scribmynor   attest that this documentation has been prepared under the direction and in the presence of Irina Michel MD.

## 2024-01-11 LAB
ALBUMIN SERPL BCP-MCNC: 4.5 G/DL (ref 3.4–5)
ALP SERPL-CCNC: 59 U/L (ref 33–136)
ALT SERPL W P-5'-P-CCNC: 16 U/L (ref 10–52)
ANA PATTERN: ABNORMAL
ANA SER QL HEP2 SUBST: POSITIVE
ANA TITR SER IF: ABNORMAL {TITER}
ANION GAP SERPL CALC-SCNC: 13 MMOL/L (ref 10–20)
AST SERPL W P-5'-P-CCNC: 17 U/L (ref 9–39)
BASOPHILS # BLD AUTO: 0.07 X10*3/UL (ref 0–0.1)
BASOPHILS NFR BLD AUTO: 0.9 %
BILIRUB SERPL-MCNC: 0.6 MG/DL (ref 0–1.2)
BUN SERPL-MCNC: 18 MG/DL (ref 6–23)
CALCIUM SERPL-MCNC: 9.3 MG/DL (ref 8.6–10.6)
CCP IGG SERPL-ACNC: <1 U/ML
CENTROMERE B AB SER-ACNC: <0.2 AI
CHLORIDE SERPL-SCNC: 100 MMOL/L (ref 98–107)
CHROMATIN AB SERPL-ACNC: <0.2 AI
CO2 SERPL-SCNC: 30 MMOL/L (ref 21–32)
CREAT SERPL-MCNC: 1.24 MG/DL (ref 0.5–1.3)
CRP SERPL-MCNC: 0.59 MG/DL
DSDNA AB SER-ACNC: <1 IU/ML
EGFRCR SERPLBLD CKD-EPI 2021: 64 ML/MIN/1.73M*2
ENA JO1 AB SER QL IA: <0.2 AI
ENA RNP AB SER IA-ACNC: 0.7 AI
ENA SCL70 AB SER QL IA: 3.2 AI
ENA SM AB SER IA-ACNC: <0.2 AI
ENA SM+RNP AB SER QL IA: <0.2 AI
ENA SS-A AB SER IA-ACNC: <0.2 AI
ENA SS-B AB SER IA-ACNC: <0.2 AI
EOSINOPHIL # BLD AUTO: 0.12 X10*3/UL (ref 0–0.7)
EOSINOPHIL NFR BLD AUTO: 1.5 %
ERYTHROCYTE [DISTWIDTH] IN BLOOD BY AUTOMATED COUNT: 13.9 % (ref 11.5–14.5)
ERYTHROCYTE [SEDIMENTATION RATE] IN BLOOD BY WESTERGREN METHOD: 11 MM/H (ref 0–20)
GLUCOSE SERPL-MCNC: 97 MG/DL (ref 74–99)
HCT VFR BLD AUTO: 52.3 % (ref 41–52)
HGB BLD-MCNC: 17.5 G/DL (ref 13.5–17.5)
IMM GRANULOCYTES # BLD AUTO: 0.06 X10*3/UL (ref 0–0.7)
IMM GRANULOCYTES NFR BLD AUTO: 0.7 % (ref 0–0.9)
LYMPHOCYTES # BLD AUTO: 2.37 X10*3/UL (ref 1.2–4.8)
LYMPHOCYTES NFR BLD AUTO: 29.6 %
MCH RBC QN AUTO: 27.8 PG (ref 26–34)
MCHC RBC AUTO-ENTMCNC: 33.5 G/DL (ref 32–36)
MCV RBC AUTO: 83 FL (ref 80–100)
MONOCYTES # BLD AUTO: 1.04 X10*3/UL (ref 0.1–1)
MONOCYTES NFR BLD AUTO: 13 %
NEUTROPHILS # BLD AUTO: 4.35 X10*3/UL (ref 1.2–7.7)
NEUTROPHILS NFR BLD AUTO: 54.3 %
NRBC BLD-RTO: 0 /100 WBCS (ref 0–0)
PLATELET # BLD AUTO: 236 X10*3/UL (ref 150–450)
POTASSIUM SERPL-SCNC: 3.6 MMOL/L (ref 3.5–5.3)
PROT SERPL-MCNC: 7.5 G/DL (ref 6.4–8.2)
RBC # BLD AUTO: 6.29 X10*6/UL (ref 4.5–5.9)
RHEUMATOID FACT SER NEPH-ACNC: <10 IU/ML (ref 0–15)
RIBOSOMAL P AB SER-ACNC: <0.2 AI
SODIUM SERPL-SCNC: 139 MMOL/L (ref 136–145)
WBC # BLD AUTO: 8 X10*3/UL (ref 4.4–11.3)

## 2024-02-05 ENCOUNTER — HOSPITAL ENCOUNTER (OUTPATIENT)
Dept: CARDIOLOGY | Facility: CLINIC | Age: 69
Discharge: HOME | End: 2024-02-05
Payer: COMMERCIAL

## 2024-02-05 DIAGNOSIS — R06.09 OTHER FORMS OF DYSPNEA: ICD-10-CM

## 2024-02-05 DIAGNOSIS — R06.02 SOB (SHORTNESS OF BREATH) ON EXERTION: ICD-10-CM

## 2024-02-05 PROCEDURE — 93350 STRESS TTE ONLY: CPT | Performed by: STUDENT IN AN ORGANIZED HEALTH CARE EDUCATION/TRAINING PROGRAM

## 2024-02-05 PROCEDURE — 93016 CV STRESS TEST SUPVJ ONLY: CPT | Performed by: STUDENT IN AN ORGANIZED HEALTH CARE EDUCATION/TRAINING PROGRAM

## 2024-02-05 PROCEDURE — 2500000004 HC RX 250 GENERAL PHARMACY W/ HCPCS (ALT 636 FOR OP/ED): Performed by: STUDENT IN AN ORGANIZED HEALTH CARE EDUCATION/TRAINING PROGRAM

## 2024-02-05 PROCEDURE — 93017 CV STRESS TEST TRACING ONLY: CPT

## 2024-02-05 PROCEDURE — 93018 CV STRESS TEST I&R ONLY: CPT | Performed by: STUDENT IN AN ORGANIZED HEALTH CARE EDUCATION/TRAINING PROGRAM

## 2024-02-05 RX ADMIN — PERFLUTREN 4 ML OF DILUTION: 6.52 INJECTION, SUSPENSION INTRAVENOUS at 13:30

## 2024-02-15 ENCOUNTER — TELEPHONE (OUTPATIENT)
Dept: PRIMARY CARE | Facility: CLINIC | Age: 69
End: 2024-02-15
Payer: COMMERCIAL

## 2024-02-15 DIAGNOSIS — R53.83 FATIGUE, UNSPECIFIED TYPE: Primary | ICD-10-CM

## 2024-02-19 ENCOUNTER — OFFICE VISIT (OUTPATIENT)
Dept: RHEUMATOLOGY | Facility: CLINIC | Age: 69
End: 2024-02-19
Payer: COMMERCIAL

## 2024-02-19 VITALS — SYSTOLIC BLOOD PRESSURE: 138 MMHG | DIASTOLIC BLOOD PRESSURE: 80 MMHG | WEIGHT: 242 LBS | BODY MASS INDEX: 37.9 KG/M2

## 2024-02-19 DIAGNOSIS — R76.8 ANA POSITIVE: Primary | ICD-10-CM

## 2024-02-19 DIAGNOSIS — M15.9 OSTEOARTHRITIS OF MULTIPLE JOINTS, UNSPECIFIED OSTEOARTHRITIS TYPE: ICD-10-CM

## 2024-02-19 PROCEDURE — 3008F BODY MASS INDEX DOCD: CPT | Performed by: INTERNAL MEDICINE

## 2024-02-19 PROCEDURE — 1036F TOBACCO NON-USER: CPT | Performed by: INTERNAL MEDICINE

## 2024-02-19 PROCEDURE — 1126F AMNT PAIN NOTED NONE PRSNT: CPT | Performed by: INTERNAL MEDICINE

## 2024-02-19 PROCEDURE — 99214 OFFICE O/P EST MOD 30 MIN: CPT | Performed by: INTERNAL MEDICINE

## 2024-02-19 PROCEDURE — 1159F MED LIST DOCD IN RCRD: CPT | Performed by: INTERNAL MEDICINE

## 2024-02-19 PROCEDURE — 99204 OFFICE O/P NEW MOD 45 MIN: CPT | Performed by: INTERNAL MEDICINE

## 2024-02-19 PROCEDURE — 3079F DIAST BP 80-89 MM HG: CPT | Performed by: INTERNAL MEDICINE

## 2024-02-19 PROCEDURE — 3075F SYST BP GE 130 - 139MM HG: CPT | Performed by: INTERNAL MEDICINE

## 2024-02-19 PROCEDURE — 1160F RVW MEDS BY RX/DR IN RCRD: CPT | Performed by: INTERNAL MEDICINE

## 2024-02-19 NOTE — PROGRESS NOTES
NP ref per Dr. Michel for evaluation and treatment of abnormal labs.  Labs tested due to SOB.  Cardiac work-up negetive.    Recom eval with Rheum for poss Scleroderma due to lab results.   Per patient SOB resolved.      HPI - he is here with +JEFF.  This was checked because of an episode or SOB and fatigue.  He had a stress test, and everything was fine.  He now thinks it was d/t anxiety.  His SOB has resolved.  He had no SOB.  No joint pain/swelling.  He has had knee pain in the past, now resolved.  Mild intermittent AM stiffness.  No fever, HA, mouth sores, raynauds, rash, CP, current resp, GI/dysphagia, or numbness/tingling.  +dry mouth at night - snores and mouth breathes    FH  - +arthritis.  No CTD  SH - nonsmoker.  No EtOH.  No drugs    Gen - NAD  HEENT - moist MM good salivary pooling  Neck - supple, no LAD  CV - RRR no r/m/g  Lungs - CTA  Abd - +BS  Extr - 2+ DP, PT, and rad pulses.  No c/c/e  Skin - no rash.  No tightening/thickening  Psych - nl affect  Neuro - nl strength.  Reflexes 2+ symmetric  Msk - no synovitis.  Few sm heberdons.  B knee crepitus.  NT and no pain with ROM throughout    A/P - Pt with +JEFF/Scl70 that was checked because of self-limited SOB.  Mild OA - asymptomatic  No s/s of CTD.    He does have a h/o tiny pulm nodules but has not had any recent imaging.  This is unlikely to be related.    Reeval 3 mo - if stable space visits

## 2024-03-28 DIAGNOSIS — K20.90 ESOPHAGITIS: ICD-10-CM

## 2024-03-28 RX ORDER — PANTOPRAZOLE SODIUM 40 MG/1
40 TABLET, DELAYED RELEASE ORAL DAILY PRN
Qty: 30 TABLET | Refills: 0 | Status: SHIPPED | OUTPATIENT
Start: 2024-03-28 | End: 2024-05-28 | Stop reason: SDUPTHER

## 2024-04-09 ENCOUNTER — APPOINTMENT (OUTPATIENT)
Dept: PRIMARY CARE | Facility: CLINIC | Age: 69
End: 2024-04-09
Payer: COMMERCIAL

## 2024-05-24 ENCOUNTER — PATIENT MESSAGE (OUTPATIENT)
Dept: PRIMARY CARE | Facility: CLINIC | Age: 69
End: 2024-05-24
Payer: COMMERCIAL

## 2024-05-24 DIAGNOSIS — I10 BENIGN ESSENTIAL HTN: ICD-10-CM

## 2024-05-24 DIAGNOSIS — K20.90 ESOPHAGITIS: ICD-10-CM

## 2024-05-24 DIAGNOSIS — E78.00 PURE HYPERCHOLESTEROLEMIA: ICD-10-CM

## 2024-05-28 RX ORDER — ATORVASTATIN CALCIUM 20 MG/1
20 TABLET, FILM COATED ORAL
Qty: 65 TABLET | Refills: 0 | Status: SHIPPED | OUTPATIENT
Start: 2024-05-28

## 2024-05-28 RX ORDER — PANTOPRAZOLE SODIUM 40 MG/1
40 TABLET, DELAYED RELEASE ORAL
Qty: 90 TABLET | Refills: 0 | Status: SHIPPED | OUTPATIENT
Start: 2024-05-28

## 2024-05-28 RX ORDER — HYDROCHLOROTHIAZIDE 25 MG/1
25 TABLET ORAL DAILY
Qty: 90 TABLET | Refills: 0 | Status: SHIPPED | OUTPATIENT
Start: 2024-05-28

## 2024-08-06 ENCOUNTER — LAB (OUTPATIENT)
Dept: LAB | Facility: LAB | Age: 69
End: 2024-08-06
Payer: COMMERCIAL

## 2024-08-06 DIAGNOSIS — Z00.00 ANNUAL PHYSICAL EXAM: ICD-10-CM

## 2024-08-06 DIAGNOSIS — Z12.5 ENCOUNTER FOR PROSTATE CANCER SCREENING: ICD-10-CM

## 2024-08-06 DIAGNOSIS — E66.01 CLASS 2 SEVERE OBESITY DUE TO EXCESS CALORIES WITH SERIOUS COMORBIDITY AND BODY MASS INDEX (BMI) OF 37.0 TO 37.9 IN ADULT (MULTI): ICD-10-CM

## 2024-08-06 DIAGNOSIS — E55.9 VITAMIN D DEFICIENCY: ICD-10-CM

## 2024-08-06 DIAGNOSIS — E78.00 PURE HYPERCHOLESTEROLEMIA: ICD-10-CM

## 2024-08-06 DIAGNOSIS — I10 BENIGN ESSENTIAL HTN: ICD-10-CM

## 2024-08-06 LAB
25(OH)D3 SERPL-MCNC: 28 NG/ML (ref 30–100)
ALBUMIN SERPL BCP-MCNC: 4 G/DL (ref 3.4–5)
ALP SERPL-CCNC: 67 U/L (ref 33–136)
ALT SERPL W P-5'-P-CCNC: 14 U/L (ref 10–52)
ANION GAP SERPL CALC-SCNC: 15 MMOL/L (ref 10–20)
AST SERPL W P-5'-P-CCNC: 18 U/L (ref 9–39)
BILIRUB SERPL-MCNC: 1.3 MG/DL (ref 0–1.2)
BUN SERPL-MCNC: 16 MG/DL (ref 6–23)
CALCIUM SERPL-MCNC: 8.9 MG/DL (ref 8.6–10.3)
CHLORIDE SERPL-SCNC: 94 MMOL/L (ref 98–107)
CHOLEST SERPL-MCNC: 146 MG/DL (ref 0–199)
CHOLESTEROL/HDL RATIO: 3.9
CO2 SERPL-SCNC: 32 MMOL/L (ref 21–32)
CREAT SERPL-MCNC: 1.32 MG/DL (ref 0.5–1.3)
CREAT UR-MCNC: 145.9 MG/DL (ref 20–370)
EGFRCR SERPLBLD CKD-EPI 2021: 59 ML/MIN/1.73M*2
ERYTHROCYTE [DISTWIDTH] IN BLOOD BY AUTOMATED COUNT: 13.1 % (ref 11.5–14.5)
EST. AVERAGE GLUCOSE BLD GHB EST-MCNC: 114 MG/DL
GLUCOSE SERPL-MCNC: 92 MG/DL (ref 74–99)
HBA1C MFR BLD: 5.6 %
HCT VFR BLD AUTO: 51.2 % (ref 41–52)
HDLC SERPL-MCNC: 37.5 MG/DL
HGB BLD-MCNC: 16.9 G/DL (ref 13.5–17.5)
LDLC SERPL CALC-MCNC: 74 MG/DL
MCH RBC QN AUTO: 27 PG (ref 26–34)
MCHC RBC AUTO-ENTMCNC: 33 G/DL (ref 32–36)
MCV RBC AUTO: 82 FL (ref 80–100)
MICROALBUMIN UR-MCNC: 12.5 MG/L
MICROALBUMIN/CREAT UR: 8.6 UG/MG CREAT
NON HDL CHOLESTEROL: 109 MG/DL (ref 0–149)
NRBC BLD-RTO: 0 /100 WBCS (ref 0–0)
PLATELET # BLD AUTO: 242 X10*3/UL (ref 150–450)
POTASSIUM SERPL-SCNC: 3.2 MMOL/L (ref 3.5–5.3)
PROT SERPL-MCNC: 6.6 G/DL (ref 6.4–8.2)
PSA SERPL-MCNC: 2.37 NG/ML
RBC # BLD AUTO: 6.27 X10*6/UL (ref 4.5–5.9)
SODIUM SERPL-SCNC: 138 MMOL/L (ref 136–145)
TRIGL SERPL-MCNC: 173 MG/DL (ref 0–149)
TSH SERPL-ACNC: 1.52 MIU/L (ref 0.44–3.98)
VIT B12 SERPL-MCNC: 246 PG/ML (ref 211–911)
VLDL: 35 MG/DL (ref 0–40)
WBC # BLD AUTO: 7.8 X10*3/UL (ref 4.4–11.3)

## 2024-08-06 PROCEDURE — 84153 ASSAY OF PSA TOTAL: CPT

## 2024-08-06 PROCEDURE — 85027 COMPLETE CBC AUTOMATED: CPT

## 2024-08-06 PROCEDURE — 82607 VITAMIN B-12: CPT

## 2024-08-06 PROCEDURE — 83036 HEMOGLOBIN GLYCOSYLATED A1C: CPT

## 2024-08-06 PROCEDURE — 80061 LIPID PANEL: CPT

## 2024-08-06 PROCEDURE — 84443 ASSAY THYROID STIM HORMONE: CPT

## 2024-08-06 PROCEDURE — 36415 COLL VENOUS BLD VENIPUNCTURE: CPT

## 2024-08-06 PROCEDURE — 82570 ASSAY OF URINE CREATININE: CPT

## 2024-08-06 PROCEDURE — 80053 COMPREHEN METABOLIC PANEL: CPT

## 2024-08-06 PROCEDURE — 82306 VITAMIN D 25 HYDROXY: CPT

## 2024-08-06 PROCEDURE — 82043 UR ALBUMIN QUANTITATIVE: CPT

## 2024-08-14 NOTE — PROGRESS NOTES
"Subjective   Patient ID: Fredy Davidson is a 68 y.o. male who presents for Follow-up.    HPI the patient presents today for follow-up on his hypertension and hyperlipidemia.  He has been taking his medications as prescribed.  He takes the atorvastatin 5 days a week and has if he takes more it causes cramps.  His potassium is low and he is not taking a supplement.  He was seen in an urgent care few weeks ago for your congestion and used an antihistamine which helped but now his fullness is back.    Review of Systems  Constitutional: No fever or chills  Cardiovascular: no chest pain, no palpitations and no syncope.   Respiratory: no cough, no shortness of breath during exertion and no shortness of breath at rest.   Gastrointestinal: no abdominal pain, no nausea and no vomiting.  Neuro: No Headache, no dizziness    Objective   /67   Pulse 75   Ht 1.702 m (5' 7\")   Wt 108 kg (239 lb)   BMI 37.43 kg/m²     Physical Exam  Constitutional: Alert and in no acute distress. Well developed, well nourished  Head and Face: Head and face: Normal.    Cardiovascular: Heart rate and rhythm were normal, normal S1 and S2. No peripheral edema.   Pulmonary: No respiratory distress. Clear bilateral breath sounds.  Musculoskeletal: Gait and station: Normal. Muscle strength/tone: Normal.   Skin: Normal skin color and pigmentation, normal skin turgor, and no rash.    Psychiatric: Judgment and insight: Intact. Mood and affect: Normal.    Lab Results   Component Value Date    WBC 7.8 08/06/2024    HGB 16.9 08/06/2024    HCT 51.2 08/06/2024     08/06/2024    CHOL 146 08/06/2024    TRIG 173 (H) 08/06/2024    HDL 37.5 08/06/2024    ALT 14 08/06/2024    AST 18 08/06/2024     08/06/2024    K 3.2 (L) 08/06/2024    CL 94 (L) 08/06/2024    CREATININE 1.32 (H) 08/06/2024    BUN 16 08/06/2024    CO2 32 08/06/2024    TSH 1.52 08/06/2024    HGBA1C 5.6 08/06/2024       Echocardiogram stress test          [facility], [address] [place] [id number]             Tel [phone] and Fax [phone]       Exercise Stress Echo    Patient Name:     MARGARITO VALVERDE    Ordering Provider:   [id number] GABINO CHANELL PUSHPA  Study Date:       2/5/2024            Reading Physician:   Cristina Douglas MD  MRN/PID:          [id number]            Supervising          [id number] Vincent Douglas                                        Physician:           MD  Accession#:       [id number]        Fellow:  Date of           1955 / 68      Fellow:  Birth/Age:        years  Gender:           M                   Nurse:               Sakina Keller RN  Admit Date:       2/5/2024            Technician:  Admission Status: Outpatient          Sonographer:         EJ ESTEVEZ  Height:           170.18 cm           Technologist:  Weight:           107.96 kg           Additional Staff:  BSA:              2.18 m2             Encounter#:          [id number]  BMI:              37.28 kg/m2         Patient Location:    [facility]    Study Type:    ECHOCARDIOGRAM STRESS TEST  Diagnosis/ICD: Other forms of dyspnea-R06.09  Indication:    Dyspnea on Exertion  CPT Code:      Stress Echo-72353; Stress Test Supervision-64458; Stress Test                 Interpretation-82391    Falls Risk: Low: Patient has a low risk for sustaining a fall; enviromental safety interventions in place.     Study Details: Correct procedure and correct patient verified verbally.       Patient History: Dyspnea, hypertension and hyperlipidemia. sleep apnea.  Allergies: None.  Smoker:    Never.  Diabetes:  No.  BMI:       Obese >30.       Medications: The patient's prescribed medication is potassium, protonix, hydrochlorathiazide, lipitor. The patient took medications as prescribed.     Patient Performance: The patient exercised to stage III on a Eddi protocol for 6 minutes and 31 seconds, achieving 7.7 METS. The peak heart rate  achieved was 141 bpm, which was 92 % of the age predicted target heart rate of 154 bpm. The resting blood pressure was 160/96 mmHg with a heart rate of 67 bpm. The patient's functional capacity was average. The patient developed shortness of breath during the stress exam. The symptoms resolved with rest. The blood pressure response was normal. The test was terminated due to: dyspnea, completed lab protocol and MPHR >85%. Patient has met the discharge criteria and is discharged to home.     Baseline ECG: Resting ECG showed normal sinus rhythm with normal tracing.     Stress ECG: Stress ECG showed sinus tachycardia, with occasional PVCs. No significant ST changes.     Stress Stage Data:  +---+------+-------+  HR Sys BPDias BP  +---+------+-------+  67 160   96       +---+------+-------+  [id number]   80       +---+------+-------+  [id number]   82       +---+------+-------+  141               +---+------+-------+       Recovery ECG: Recovery ECG showed normal sinus rhythm, with rare PACs and PVCs.     +------------+---+------+-------+              HR Sys BPDias BP  +------------+---+------+-------+  Recovery I  [id number]   77       +------------+---+------+-------+  Recovery II 92 191   83       +------------+---+------+-------+  Recovery III89 168   89       +------------+---+------+-------+  Recovery IV 89 144   83       +------------+---+------+-------+       Baseline Echo: Global LV systolic function is normal. The resting baseline ejection fraction was estimated at 55 to 60%. There are no regional wall motion abnormalities at baseline. Spectral Doppler shows an impaired relaxation pattern of LV diastolic filling. Normal LV filling pressures.     Stress Echo: There are no stress induced regional wall motion abnormalities. At peak stress, the global LV systolic function is increased.     Mitral Valve:  Resting  MV E Vmax:  0.67 m/s  Lateral e': 0.10 m/s  Medial e':  0.06 m/s  E/e':        6.90       Summary:   1. The resting ejection fraction was estimated at 55 to 60%.   2. Normal global left ventricular systolic function.   3. The patient's functional capacity was average. The blood pressure response was normal.   4. Spectral Doppler shows an impaired relaxation pattern of LV diastolic filling.   5. Adequate level of stress achieved.   6. No clinical, echocardiographic or electrocardiographic evidence for ischemia at a maximal workload.    [id number] Vincent Douglas MD  Electronically signed on 2/5/2024 at 2:43:03 PM            ** Final **      Assessment/Plan   Assessment & Plan  Benign essential HTN  Continue hydrochlorothiazide  Orders:    Follow Up In [facility] - PCP - Established    hydroCHLOROthiazide (HYDRODiuril) 25 mg tablet; Take 1 tablet (25 mg) by mouth once daily.    Comprehensive Metabolic Panel; Future    CBC; Future    Follow Up In [facility] - PCP - Health Maintenance; Future    Hypokalemia  I will start the patient on a potassium supplement  Orders:    potassium chloride CR (Klor-Con M20) 20 mEq ER tablet; Take 1 tablet (20 mEq) by mouth once daily. Do not crush or chew.    Esophagitis  Continue pantoprazole  Orders:    pantoprazole (ProtoNix) 40 mg EC tablet; Take 1 tablet (40 mg) by mouth once daily in the morning. Take before meals.    Pure hypercholesterolemia  Continue atorvastatin at the current dose  Orders:    atorvastatin (Lipitor) 20 mg tablet; Take 1 tablet (20 mg) by mouth once daily (M-F).    Encounter for prostate cancer screening  PSA was slightly high this time so we will recheck in 6 months  Orders:    Prostate Specific Antigen, Screen; Future    Sensation of fullness in both ears  Advised patient to use Flonase and to take Sudafed over-the-counter for 2 to 3 days but not anymore as it can raise his blood pressure.               Your yearly Physical is due in: Feb 2025  When you call the office for your yearly Physical, please ask them to  inform me to order your blood work, so that you can get the fasting blood work before your appointment and we can discuss the results at your physical.      Please call me if any questions arise from now until your next visit. I will call you after I am done seeing patients. A Doctor is always available by phone when the office is closed. Please feel free to call for help with any problem that you feel shouldn't wait until the office re-opens.     Irina Michel MD

## 2024-08-16 ENCOUNTER — APPOINTMENT (OUTPATIENT)
Dept: PRIMARY CARE | Facility: CLINIC | Age: 69
End: 2024-08-16
Payer: COMMERCIAL

## 2024-08-16 VITALS
BODY MASS INDEX: 37.51 KG/M2 | WEIGHT: 239 LBS | DIASTOLIC BLOOD PRESSURE: 67 MMHG | HEART RATE: 75 BPM | HEIGHT: 67 IN | SYSTOLIC BLOOD PRESSURE: 127 MMHG

## 2024-08-16 DIAGNOSIS — E87.6 HYPOKALEMIA: Primary | ICD-10-CM

## 2024-08-16 DIAGNOSIS — I10 BENIGN ESSENTIAL HTN: ICD-10-CM

## 2024-08-16 DIAGNOSIS — Z12.5 ENCOUNTER FOR PROSTATE CANCER SCREENING: ICD-10-CM

## 2024-08-16 DIAGNOSIS — E78.00 PURE HYPERCHOLESTEROLEMIA: ICD-10-CM

## 2024-08-16 DIAGNOSIS — K20.90 ESOPHAGITIS: ICD-10-CM

## 2024-08-16 DIAGNOSIS — H93.8X3 SENSATION OF FULLNESS IN BOTH EARS: ICD-10-CM

## 2024-08-16 PROCEDURE — 1036F TOBACCO NON-USER: CPT | Performed by: FAMILY MEDICINE

## 2024-08-16 PROCEDURE — 3074F SYST BP LT 130 MM HG: CPT | Performed by: FAMILY MEDICINE

## 2024-08-16 PROCEDURE — 3078F DIAST BP <80 MM HG: CPT | Performed by: FAMILY MEDICINE

## 2024-08-16 PROCEDURE — 1160F RVW MEDS BY RX/DR IN RCRD: CPT | Performed by: FAMILY MEDICINE

## 2024-08-16 PROCEDURE — 1159F MED LIST DOCD IN RCRD: CPT | Performed by: FAMILY MEDICINE

## 2024-08-16 PROCEDURE — 99214 OFFICE O/P EST MOD 30 MIN: CPT | Performed by: FAMILY MEDICINE

## 2024-08-16 PROCEDURE — 1123F ACP DISCUSS/DSCN MKR DOCD: CPT | Performed by: FAMILY MEDICINE

## 2024-08-16 PROCEDURE — 3008F BODY MASS INDEX DOCD: CPT | Performed by: FAMILY MEDICINE

## 2024-08-16 RX ORDER — PANTOPRAZOLE SODIUM 40 MG/1
40 TABLET, DELAYED RELEASE ORAL
Qty: 90 TABLET | Refills: 1 | Status: SHIPPED | OUTPATIENT
Start: 2024-08-16

## 2024-08-16 RX ORDER — POTASSIUM CHLORIDE 20 MEQ/1
20 TABLET, EXTENDED RELEASE ORAL DAILY
Qty: 90 TABLET | Refills: 1 | Status: SHIPPED | OUTPATIENT
Start: 2024-08-16

## 2024-08-16 RX ORDER — HYDROCHLOROTHIAZIDE 25 MG/1
25 TABLET ORAL DAILY
Qty: 90 TABLET | Refills: 1 | Status: SHIPPED | OUTPATIENT
Start: 2024-08-16

## 2024-08-16 RX ORDER — ATORVASTATIN CALCIUM 20 MG/1
20 TABLET, FILM COATED ORAL
Qty: 65 TABLET | Refills: 1 | Status: SHIPPED | OUTPATIENT
Start: 2024-08-16

## 2024-08-16 NOTE — ASSESSMENT & PLAN NOTE
Continue hydrochlorothiazide  Orders:    Follow Up In [facility] - PCP - Established    hydroCHLOROthiazide (HYDRODiuril) 25 mg tablet; Take 1 tablet (25 mg) by mouth once daily.    Comprehensive Metabolic Panel; Future    CBC; Future    Follow Up In [facility] - PCP - Health Maintenance; Future

## 2024-08-16 NOTE — ASSESSMENT & PLAN NOTE
Continue atorvastatin at the current dose  Orders:    atorvastatin (Lipitor) 20 mg tablet; Take 1 tablet (20 mg) by mouth once daily (M-F).

## 2024-08-16 NOTE — ASSESSMENT & PLAN NOTE
Continue pantoprazole  Orders:    pantoprazole (ProtoNix) 40 mg EC tablet; Take 1 tablet (40 mg) by mouth once daily in the morning. Take before meals.

## 2024-08-30 ENCOUNTER — PATIENT MESSAGE (OUTPATIENT)
Dept: PRIMARY CARE | Facility: CLINIC | Age: 69
End: 2024-08-30
Payer: COMMERCIAL

## 2024-08-30 DIAGNOSIS — R05.1 ACUTE COUGH: Primary | ICD-10-CM

## 2024-09-01 RX ORDER — PROMETHAZINE HYDROCHLORIDE AND DEXTROMETHORPHAN HYDROBROMIDE 6.25; 15 MG/5ML; MG/5ML
5 SYRUP ORAL EVERY 4 HOURS PRN
Qty: 120 ML | Refills: 0 | Status: SHIPPED | OUTPATIENT
Start: 2024-09-01

## 2024-09-01 RX ORDER — AZITHROMYCIN 250 MG/1
TABLET, FILM COATED ORAL
Qty: 6 TABLET | Refills: 0 | Status: SHIPPED | OUTPATIENT
Start: 2024-09-01 | End: 2024-09-06

## 2024-09-05 DIAGNOSIS — R05.1 ACUTE COUGH: Primary | ICD-10-CM

## 2024-09-05 RX ORDER — ALBUTEROL SULFATE 90 UG/1
2 INHALANT RESPIRATORY (INHALATION) EVERY 4 HOURS PRN
Qty: 8.5 G | Refills: 0 | Status: SHIPPED | OUTPATIENT
Start: 2024-09-05 | End: 2025-09-05

## 2024-09-06 RX ORDER — AMOXICILLIN AND CLAVULANATE POTASSIUM 875; 125 MG/1; MG/1
875 TABLET, FILM COATED ORAL 2 TIMES DAILY
Qty: 14 TABLET | Refills: 0 | Status: SHIPPED | OUTPATIENT
Start: 2024-09-06 | End: 2024-09-13

## 2024-09-06 RX ORDER — PREDNISONE 5 MG/1
TABLET ORAL
Qty: 30 TABLET | Refills: 0 | Status: SHIPPED | OUTPATIENT
Start: 2024-09-06

## 2024-12-31 ENCOUNTER — OFFICE VISIT (OUTPATIENT)
Dept: URGENT CARE | Age: 69
End: 2024-12-31
Payer: COMMERCIAL

## 2024-12-31 VITALS
WEIGHT: 241.4 LBS | DIASTOLIC BLOOD PRESSURE: 100 MMHG | TEMPERATURE: 98.2 F | HEIGHT: 68 IN | HEART RATE: 75 BPM | OXYGEN SATURATION: 95 % | BODY MASS INDEX: 36.59 KG/M2 | SYSTOLIC BLOOD PRESSURE: 157 MMHG

## 2024-12-31 DIAGNOSIS — H65.00 ACUTE SEROUS OTITIS MEDIA, RECURRENCE NOT SPECIFIED, UNSPECIFIED LATERALITY: Primary | ICD-10-CM

## 2024-12-31 PROCEDURE — 1123F ACP DISCUSS/DSCN MKR DOCD: CPT | Performed by: NURSE PRACTITIONER

## 2024-12-31 PROCEDURE — 3080F DIAST BP >= 90 MM HG: CPT | Performed by: NURSE PRACTITIONER

## 2024-12-31 PROCEDURE — 1036F TOBACCO NON-USER: CPT | Performed by: NURSE PRACTITIONER

## 2024-12-31 PROCEDURE — 1159F MED LIST DOCD IN RCRD: CPT | Performed by: NURSE PRACTITIONER

## 2024-12-31 PROCEDURE — 99213 OFFICE O/P EST LOW 20 MIN: CPT | Performed by: NURSE PRACTITIONER

## 2024-12-31 PROCEDURE — 3077F SYST BP >= 140 MM HG: CPT | Performed by: NURSE PRACTITIONER

## 2024-12-31 PROCEDURE — 3008F BODY MASS INDEX DOCD: CPT | Performed by: NURSE PRACTITIONER

## 2024-12-31 PROCEDURE — 1125F AMNT PAIN NOTED PAIN PRSNT: CPT | Performed by: NURSE PRACTITIONER

## 2024-12-31 RX ORDER — AMOXICILLIN AND CLAVULANATE POTASSIUM 875; 125 MG/1; MG/1
1 TABLET, FILM COATED ORAL 2 TIMES DAILY
Qty: 20 TABLET | Refills: 0 | Status: SHIPPED | OUTPATIENT
Start: 2024-12-31 | End: 2025-01-10

## 2024-12-31 ASSESSMENT — ENCOUNTER SYMPTOMS
ENDOCRINE NEGATIVE: 1
SINUS PAIN: 1
EYES NEGATIVE: 1
GASTROINTESTINAL NEGATIVE: 1
HEMATOLOGIC/LYMPHATIC NEGATIVE: 1
CARDIOVASCULAR NEGATIVE: 1
MUSCULOSKELETAL NEGATIVE: 1
RHINORRHEA: 1
SINUS PRESSURE: 1
ALLERGIC/IMMUNOLOGIC NEGATIVE: 1
COUGH: 1
NEUROLOGICAL NEGATIVE: 1
FATIGUE: 1
PSYCHIATRIC NEGATIVE: 1

## 2024-12-31 ASSESSMENT — PAIN SCALES - GENERAL: PAINLEVEL_OUTOF10: 4

## 2024-12-31 NOTE — PROGRESS NOTES
Subjective   Patient ID: Fredy Davidson is a 69 y.o. male. They present today with a chief complaint of Earache (Bilat, left hurts worse than right. Since Saturday night) and Sinus Drainage (For 1 week).    History of Present Illness    History provided by:  Patient   used: No    Earache   There is pain in both ears. The current episode started in the past 7 days. The problem has been gradually worsening. The fever has been present for Less than 1 day. Associated symptoms include coughing and rhinorrhea. He has tried acetaminophen for the symptoms. The treatment provided no relief.       Past Medical History  Allergies as of 12/31/2024    (No Known Allergies)       (Not in a hospital admission)       Past Medical History:   Diagnosis Date    Calculus of gallbladder without cholecystitis without obstruction 12/19/2017    Symptomatic cholelithiasis    Calculus of gallbladder without cholecystitis without obstruction     Gallstones    Chronic kidney disease, stage 3 unspecified (Multi) 05/10/2019    CKD (chronic kidney disease), stage III    COVID-19 06/14/2022    COVID-19 virus infection    Infectious gastroenteritis and colitis, unspecified 03/19/2015    Infectious gastroenteritis    Localized swelling, mass and lump, unspecified upper limb 12/02/2016    Forearm mass    Low back pain, unspecified 12/02/2019    Right low back pain    Morbid (severe) obesity due to excess calories (Multi) 04/19/2022    Class 2 severe obesity with serious comorbidity and body mass index (BMI) of 37.0 to 37.9 in adult    Other conditions influencing health status 12/05/2018    History of cough    Other specified disorders of kidney and ureter 06/10/2019    Renal mass, left    Other specified soft tissue disorders 12/12/2016    Soft tissue mass    Personal history of diseases of the blood and blood-forming organs and certain disorders involving the immune mechanism 06/29/2018    History of leukocytosis    Personal  "history of other diseases of the nervous system and sense organs 03/27/2015    History of sleep apnea    Personal history of other diseases of the respiratory system 02/14/2020    History of sinusitis    Personal history of other specified conditions 05/30/2017    History of fatigue    Right upper quadrant pain 12/14/2017    Abdominal pain, acute, right upper quadrant       Past Surgical History:   Procedure Laterality Date    APPENDECTOMY  03/10/2014    Appendectomy    COLONOSCOPY  12/12/2016    Complete Colonoscopy    OTHER SURGICAL HISTORY  09/24/2020    Cholecystectomy        reports that he has never smoked. He has never used smokeless tobacco. He reports that he does not currently use alcohol. He reports that he does not use drugs.    Review of Systems  Review of Systems   Constitutional:  Positive for fatigue.   HENT:  Positive for ear pain, rhinorrhea, sinus pressure and sinus pain.    Eyes: Negative.    Respiratory:  Positive for cough.    Cardiovascular: Negative.    Gastrointestinal: Negative.    Endocrine: Negative.    Genitourinary: Negative.    Musculoskeletal: Negative.    Skin: Negative.    Allergic/Immunologic: Negative.    Neurological: Negative.    Hematological: Negative.    Psychiatric/Behavioral: Negative.                                    Objective    Vitals:    12/31/24 1414   BP: (!) 157/100   Pulse: 75   Temp: 36.8 °C (98.2 °F)   SpO2: 95%   Weight: 110 kg (241 lb 6.5 oz)   Height: 1.715 m (5' 7.5\")     No LMP for male patient.    Physical Exam  Vitals and nursing note reviewed.   Constitutional:       Appearance: Normal appearance. He is normal weight.   HENT:      Right Ear: Tympanic membrane is erythematous.      Left Ear: Tympanic membrane is erythematous.      Nose: Nasal tenderness, mucosal edema, congestion and rhinorrhea present. Rhinorrhea is purulent.      Right Sinus: Maxillary sinus tenderness present.      Left Sinus: Maxillary sinus tenderness present.   Cardiovascular:      " Rate and Rhythm: Normal rate and regular rhythm.      Pulses: Normal pulses.      Heart sounds: Normal heart sounds.   Pulmonary:      Effort: Pulmonary effort is normal.      Breath sounds: Normal breath sounds.   Abdominal:      General: Bowel sounds are normal.      Palpations: Abdomen is soft.   Musculoskeletal:      Cervical back: Normal range of motion and neck supple.   Skin:     General: Skin is warm and dry.   Neurological:      General: No focal deficit present.      Mental Status: He is alert and oriented to person, place, and time. Mental status is at baseline.   Psychiatric:         Mood and Affect: Mood normal.         Behavior: Behavior normal.         Thought Content: Thought content normal.         Judgment: Judgment normal.         Procedures    Point of Care Test & Imaging Results from this visit  No results found for this visit on 12/31/24.   No results found.    Diagnostic study results (if any) were reviewed by CASSIA Santamaria.    Assessment/Plan   Allergies, medications, history, and pertinent labs/EKGs/Imaging reviewed by CASSIA Santamaria.     Medical Decision Making  Medical Decision Making  At time of discharge patient was clinically well-appearing and HDS for outpatient management. The patient and/or family was educated regarding diagnosis, supportive care, OTC and Rx medications. The patient and/or family was given the opportunity to ask questions prior to discharge.  They verbalized understanding of my discussion of the plans for treatment, expected course, indications to return to  or seek further evaluation in ED, and the need for timely follow up as directed.   They were provided with a work/school excuse if requested.        Orders and Diagnoses  Diagnoses and all orders for this visit:  Acute serous otitis media, recurrence not specified, unspecified laterality  -     amoxicillin-pot clavulanate (Augmentin) 875-125 mg tablet; Take 1 tablet by mouth 2 times a  day for 10 days.    Return to Urgent care if symptoms return or progress  Follow up with PCP in 1-2 weeks     Patient disposition: Home    Electronically signed by CASSIA Santamaria  2:53 PM

## 2025-01-13 ENCOUNTER — OFFICE VISIT (OUTPATIENT)
Dept: URGENT CARE | Age: 70
End: 2025-01-13
Payer: COMMERCIAL

## 2025-01-13 VITALS
TEMPERATURE: 97.5 F | BODY MASS INDEX: 36.69 KG/M2 | OXYGEN SATURATION: 97 % | HEART RATE: 69 BPM | SYSTOLIC BLOOD PRESSURE: 156 MMHG | HEIGHT: 68 IN | DIASTOLIC BLOOD PRESSURE: 89 MMHG | WEIGHT: 242.06 LBS | RESPIRATION RATE: 18 BRPM

## 2025-01-13 DIAGNOSIS — H69.93 DYSFUNCTION OF BOTH EUSTACHIAN TUBES: Primary | ICD-10-CM

## 2025-01-13 RX ORDER — METHYLPREDNISOLONE 4 MG/1
TABLET ORAL
Qty: 21 TABLET | Refills: 0 | Status: SHIPPED | OUTPATIENT
Start: 2025-01-13 | End: 2025-01-19

## 2025-01-13 NOTE — PROGRESS NOTES
Subjective   Patient ID: Fredy Davidson is a 69 y.o. male. They present today with a chief complaint of Follow-up (PT states he was here last week for ear infection, not as bad but not completely healed. 2 AUGMENTIN tablets left. //Wants to ask about ear drops).    History of Present Illness  See mdm       History provided by:  Patient   used: No        Past Medical History  Allergies as of 01/13/2025    (No Known Allergies)       (Not in a hospital admission)       Past Medical History:   Diagnosis Date    Calculus of gallbladder without cholecystitis without obstruction 12/19/2017    Symptomatic cholelithiasis    Calculus of gallbladder without cholecystitis without obstruction     Gallstones    Chronic kidney disease, stage 3 unspecified (Multi) 05/10/2019    CKD (chronic kidney disease), stage III    COVID-19 06/14/2022    COVID-19 virus infection    Infectious gastroenteritis and colitis, unspecified 03/19/2015    Infectious gastroenteritis    Localized swelling, mass and lump, unspecified upper limb 12/02/2016    Forearm mass    Low back pain, unspecified 12/02/2019    Right low back pain    Morbid (severe) obesity due to excess calories (Multi) 04/19/2022    Class 2 severe obesity with serious comorbidity and body mass index (BMI) of 37.0 to 37.9 in adult    Other conditions influencing health status 12/05/2018    History of cough    Other specified disorders of kidney and ureter 06/10/2019    Renal mass, left    Other specified soft tissue disorders 12/12/2016    Soft tissue mass    Personal history of diseases of the blood and blood-forming organs and certain disorders involving the immune mechanism 06/29/2018    History of leukocytosis    Personal history of other diseases of the nervous system and sense organs 03/27/2015    History of sleep apnea    Personal history of other diseases of the respiratory system 02/14/2020    History of sinusitis    Personal history of other specified  "conditions 05/30/2017    History of fatigue    Right upper quadrant pain 12/14/2017    Abdominal pain, acute, right upper quadrant       Past Surgical History:   Procedure Laterality Date    APPENDECTOMY  03/10/2014    Appendectomy    COLONOSCOPY  12/12/2016    Complete Colonoscopy    OTHER SURGICAL HISTORY  09/24/2020    Cholecystectomy        reports that he has never smoked. He has never used smokeless tobacco. He reports that he does not currently use alcohol. He reports that he does not use drugs.    Review of Systems  Review of Systems   All other systems reviewed and are negative.                                 Objective    Vitals:    01/13/25 1324   BP: 156/89   Pulse: 69   Resp: 18   Temp: 36.4 °C (97.5 °F)   TempSrc: Oral   SpO2: 97%   Weight: 110 kg (242 lb 1 oz)   Height: 1.715 m (5' 7.5\")     No LMP for male patient.    Physical Exam  Vitals and nursing note reviewed.   Constitutional:       General: He is not in acute distress.     Appearance: He is normal weight. He is not ill-appearing, toxic-appearing or diaphoretic.   HENT:      Head: Normocephalic and atraumatic.      Right Ear: Tympanic membrane, ear canal and external ear normal.      Left Ear: Tympanic membrane, ear canal and external ear normal.      Nose: Nose normal.      Mouth/Throat:      Mouth: Mucous membranes are moist.      Pharynx: No oropharyngeal exudate or posterior oropharyngeal erythema.   Eyes:      General: No scleral icterus.        Right eye: No discharge.         Left eye: No discharge.      Extraocular Movements: Extraocular movements intact.      Conjunctiva/sclera: Conjunctivae normal.      Pupils: Pupils are equal, round, and reactive to light.   Cardiovascular:      Rate and Rhythm: Normal rate and regular rhythm.      Pulses: Normal pulses.      Heart sounds: No murmur heard.     No friction rub. No gallop.   Pulmonary:      Effort: Pulmonary effort is normal. No respiratory distress.      Breath sounds: No stridor. " No wheezing, rhonchi or rales.   Abdominal:      General: Abdomen is flat.      Tenderness: There is no abdominal tenderness. There is no guarding or rebound.   Musculoskeletal:      Cervical back: Normal range of motion and neck supple. No rigidity or tenderness.      Right lower leg: No edema.      Left lower leg: No edema.   Skin:     General: Skin is warm and dry.      Capillary Refill: Capillary refill takes less than 2 seconds.   Neurological:      General: No focal deficit present.      Mental Status: He is alert.   Psychiatric:         Mood and Affect: Mood normal.         Behavior: Behavior normal.         Procedures    Point of Care Test & Imaging Results from this visit  No results found for this visit on 01/13/25.   No results found.    Diagnostic study results (if any) were reviewed by Val Yip PA-C.    Assessment/Plan   Allergies, medications, history, and pertinent labs/EKGs/Imaging reviewed by Val Yip PA-C.     Medical Decision Making  69-year-old male presents with complaint of bilateral otalgia.  Patient reports he was treated for right otitis media with Augmentin.  He states that his symptoms have improved somewhat however he is still having persistent significant ear pain.  He is using Flonase every morning and he does have some chronic sinusitis.  He normally takes Zyrtec but had stopped that while taking antibiotics.  Also stopped his antihypertensive, was not sure if okay to take with antibiotics.  Ear exam is normal.  No features of acute otitis media, malignant otitis media or externa, TM perforation, cerumen impaction, mastoiditis.  Consider eustachian tube dysfunction as he states he does have some sinusitis and popping of his ears which has been worse since flying over [place].  Will treat with steroids for this reason.  Follow-up with primary care provider.  Advised him to restart his Zyrtec and antihypertensive particularly while taking steroids.  Discussed that  steroids can increase BP, monitor.   Encouraged follow-up with primary care provider.  Discussed indications for presentation to emergency department.  Discharged good condition agreeable to plan as discussed.     Orders and Diagnoses  Diagnoses and all orders for this visit:  Dysfunction of both eustachian tubes  -     methylPREDNISolone (Medrol Dospak) 4 mg tablets; Follow schedule on package instructions      Medical Admin Record      Patient disposition: Home    Electronically signed by Val Yip PA-C  1:21 PM

## 2025-01-24 ENCOUNTER — PATIENT MESSAGE (OUTPATIENT)
Dept: PRIMARY CARE | Facility: CLINIC | Age: 70
End: 2025-01-24
Payer: COMMERCIAL

## 2025-01-24 DIAGNOSIS — R30.0 DYSURIA: Primary | ICD-10-CM

## 2025-02-20 LAB
ALBUMIN SERPL-MCNC: 4.3 G/DL (ref 3.6–5.1)
ALP SERPL-CCNC: 63 U/L (ref 35–144)
ALT SERPL-CCNC: 15 U/L (ref 9–46)
ANION GAP SERPL CALCULATED.4IONS-SCNC: 8 MMOL/L (CALC) (ref 7–17)
APPEARANCE UR: CLEAR
AST SERPL-CCNC: 19 U/L (ref 10–35)
BACTERIA #/AREA URNS HPF: ABNORMAL /HPF
BACTERIA UR CULT: ABNORMAL
BILIRUB SERPL-MCNC: 0.8 MG/DL (ref 0.2–1.2)
BILIRUB UR QL STRIP: NEGATIVE
BUN SERPL-MCNC: 17 MG/DL (ref 7–25)
CALCIUM SERPL-MCNC: 9.1 MG/DL (ref 8.6–10.3)
CHLORIDE SERPL-SCNC: 99 MMOL/L (ref 98–110)
CO2 SERPL-SCNC: 32 MMOL/L (ref 20–32)
COLOR UR: ABNORMAL
CREAT SERPL-MCNC: 1.21 MG/DL (ref 0.7–1.35)
EGFRCR SERPLBLD CKD-EPI 2021: 66 ML/MIN/1.73M2
ERYTHROCYTE [DISTWIDTH] IN BLOOD BY AUTOMATED COUNT: 13 % (ref 11–15)
GLUCOSE SERPL-MCNC: 97 MG/DL (ref 65–99)
GLUCOSE UR QL STRIP: NEGATIVE
HCT VFR BLD AUTO: 52.7 % (ref 38.5–50)
HGB BLD-MCNC: 17.3 G/DL (ref 13.2–17.1)
HGB UR QL STRIP: NEGATIVE
HYALINE CASTS #/AREA URNS LPF: ABNORMAL /LPF
KETONES UR QL STRIP: NEGATIVE
LEUKOCYTE ESTERASE UR QL STRIP: ABNORMAL
MCH RBC QN AUTO: 27.9 PG (ref 27–33)
MCHC RBC AUTO-ENTMCNC: 32.8 G/DL (ref 32–36)
MCV RBC AUTO: 84.9 FL (ref 80–100)
NITRITE UR QL STRIP: NEGATIVE
PH UR STRIP: 7 [PH] (ref 5–8)
PLATELET # BLD AUTO: 256 THOUSAND/UL (ref 140–400)
PMV BLD REES-ECKER: 10.4 FL (ref 7.5–12.5)
POTASSIUM SERPL-SCNC: 3.6 MMOL/L (ref 3.5–5.3)
PROT SERPL-MCNC: 7.1 G/DL (ref 6.1–8.1)
PROT UR QL STRIP: ABNORMAL
PSA SERPL-MCNC: 2.51 NG/ML
RBC # BLD AUTO: 6.21 MILLION/UL (ref 4.2–5.8)
RBC #/AREA URNS HPF: ABNORMAL /HPF
SERVICE CMNT-IMP: ABNORMAL
SODIUM SERPL-SCNC: 139 MMOL/L (ref 135–146)
SP GR UR STRIP: 1.02 (ref 1–1.03)
SQUAMOUS #/AREA URNS HPF: ABNORMAL /HPF
WBC # BLD AUTO: 7.4 THOUSAND/UL (ref 3.8–10.8)
WBC #/AREA URNS HPF: ABNORMAL /HPF

## 2025-02-21 ENCOUNTER — APPOINTMENT (OUTPATIENT)
Dept: PRIMARY CARE | Facility: CLINIC | Age: 70
End: 2025-02-21
Payer: COMMERCIAL

## 2025-02-21 NOTE — PROGRESS NOTES
Subjective   Patient ID: Fredy Davidson is a 69 y.o. male who presents for No chief complaint on file..    Last Annual Physical:  Last Dental Visit:   Last Eye exam:  Hearing Concerns:  Diet:   Exercise Routine:       HPI     Review of Systems  Constitutional: No fever or chills, No Night Sweats  Eyes: No Blurry Vision or Eye sight problems  ENT: No Nasal Discharge, Hoarseness, sore throat  Cardiovascular: no chest pain, no palpitations and no syncope.   Respiratory: no cough, no shortness of breath during exertion and no shortness of breath at rest.   Gastrointestinal: no abdominal pain, no nausea and no vomiting.   : No issues with urinary stream, burning with urination, no blood in urine or stools  Skin: No Skin rashes or Lesions  Neuro: No Headache, no dizziness or Numbness or tingling  Psych: No Anxiety, depression or sleeping problems  Heme: No Easy bleeding or brusing.     Objective   There were no vitals taken for this visit.    Physical Exam  Constitutional: Alert and in no acute distress. Well developed, well nourished.   Head and Face: Head and face: Normal.    Eyes: Normal external exam. Pupils were equal in size, round, reactive to light (PERRL) with normal accommodation and extraocular movements intact (EOMI).   Ears, Nose, Mouth, and Throat: External inspection of ears and nose: Normal.  Hearing: Normal.  Nasal mucosa, septum, and turbinates: Normal.  Lips, teeth, and gums: Normal.  Oropharynx: Normal.   Neck: No neck mass was observed. Supple. Thyroid not enlarged and there were no palpable thyroid nodules.   Cardiovascular: Heart rate and rhythm were normal, normal S1 and S2. Pedal pulses: Normal. No peripheral edema.   Pulmonary: No respiratory distress. Clear bilateral breath sounds.   Abdomen: Soft nontender; no abdominal mass palpated. Normal bowel sounds. No organomegaly.   : Deferred  Musculoskeletal: No joint swelling seen, normal movements of all extremities. Range of motion: Normal.   Muscle strength/tone: Normal.    Skin: Normal skin color and pigmentation, normal skin turgor, and no rash.   Neurologic: Deep tendon reflexes were 2+ and symmetric.   Psychiatric: Judgment and insight: Intact. Mood and affect: Normal.  Lymphatic: No cervical lymphadenopathy. Palpation of lymph nodes in axillae: Normal.  Palpation of lymph nodes in groin: Normal.    Lab Results   Component Value Date    WBC 7.4 02/20/2025    HGB 17.3 (H) 02/20/2025    HCT 52.7 (H) 02/20/2025     02/20/2025    CHOL 146 08/06/2024    TRIG 173 (H) 08/06/2024    HDL 37.5 08/06/2024    ALT 15 02/20/2025    AST 19 02/20/2025     02/20/2025    K 3.6 02/20/2025    CL 99 02/20/2025    CREATININE 1.21 02/20/2025    BUN 17 02/20/2025    CO2 32 02/20/2025    TSH 1.52 08/06/2024    PSA 2.51 02/20/2025    HGBA1C 5.6 08/06/2024       Echocardiogram stress test          [facility], [address] [place] [address]             Tel [phone] and Fax [phone]       Exercise Stress Echo    Patient Name:     MARGARITO VALVERDE    Ordering Provider:   [id number] GABINO BARROW  Study Date:       2/5/2024            Reading Physician:   Cristina Douglas MD  MRN/PID:          [id number]            Supervising          Cristina Douglas                                        Physician:           MD  Accession#:       [id number]        Fellow:  Date of           1955 / 68      Fellow:  Birth/Age:        years  Gender:           M                   Nurse:               Sakina Keller RN  Admit Date:       2/5/2024            Technician:  Admission Status: Outpatient          Sonographer:         EJ ESTEVEZ  Height:           170.18 cm           Technologist:  Weight:           107.96 kg           Additional Staff:  BSA:              2.18 m2             Encounter#:          [id number]  BMI:              37.28 kg/m2         Patient Location:    [facility]  Lab    Study Type:    ECHOCARDIOGRAM STRESS TEST  Diagnosis/ICD: Other forms of dyspnea-R06.09  Indication:    Dyspnea on Exertion  CPT Code:      Stress Echo-76620; Stress Test Supervision-65558; Stress Test                 Interpretation-42891    Falls Risk: Low: Patient has a low risk for sustaining a fall; enviromental safety interventions in place.     Study Details: Correct procedure and correct patient verified verbally.       Patient History: Dyspnea, hypertension and hyperlipidemia. sleep apnea.  Allergies: None.  Smoker:    Never.  Diabetes:  No.  BMI:       Obese >30.       Medications: The patient's prescribed medication is potassium, protonix, hydrochlorathiazide, lipitor. The patient took medications as prescribed.     Patient Performance: The patient exercised to stage III on a Eddi protocol for 6 minutes and 31 seconds, achieving 7.7 METS. The peak heart rate achieved was 141 bpm, which was 92 % of the age predicted target heart rate of 154 bpm. The resting blood pressure was 160/96 mmHg with a heart rate of 67 bpm. The patient's functional capacity was average. The patient developed shortness of breath during the stress exam. The symptoms resolved with rest. The blood pressure response was normal. The test was terminated due to: dyspnea, completed lab protocol and MPHR >85%. Patient has met the discharge criteria and is discharged to home.     Baseline ECG: Resting ECG showed normal sinus rhythm with normal tracing.     Stress ECG: Stress ECG showed sinus tachycardia, with occasional PVCs. No significant ST changes.     Stress Stage Data:  +---+------+-------+  HR Sys BPDias BP  +---+------+-------+  67 160   96       +---+------+-------+  [id number]   80       +---+------+-------+  [id number]   82       +---+------+-------+  141               +---+------+-------+       Recovery ECG: Recovery ECG showed normal sinus rhythm, with rare PACs and PVCs.      +------------+---+------+-------+              HR Sys BPDias BP  +------------+---+------+-------+  Recovery I  [id number]   77       +------------+---+------+-------+  Recovery II 92 191   83       +------------+---+------+-------+  Recovery III89 168   89       +------------+---+------+-------+  Recovery IV 89 144   83       +------------+---+------+-------+       Baseline Echo: Global LV systolic function is normal. The resting baseline ejection fraction was estimated at 55 to 60%. There are no regional wall motion abnormalities at baseline. Spectral Doppler shows an impaired relaxation pattern of LV diastolic filling. Normal LV filling pressures.     Stress Echo: There are no stress induced regional wall motion abnormalities. At peak stress, the global LV systolic function is increased.     Mitral Valve:  Resting  MV E Vmax:  0.67 m/s  Lateral e': 0.10 m/s  Medial e':  0.06 m/s  E/e':       6.90       Summary:   1. The resting ejection fraction was estimated at 55 to 60%.   2. Normal global left ventricular systolic function.   3. The patient's functional capacity was average. The blood pressure response was normal.   4. Spectral Doppler shows an impaired relaxation pattern of LV diastolic filling.   5. Adequate level of stress achieved.   6. No clinical, echocardiographic or electrocardiographic evidence for ischemia at a maximal workload.    [id number] Vincent Douglas MD  Electronically signed on 2/5/2024 at 2:43:03 PM            ** Final **      Assessment/Plan   There are no diagnoses linked to this encounter.      Dear Fredy Davidson     It was my pleasure to take care of you today in the office. Below are the things we discussed today:    1. Immunizations: Yearly Flu shot is recommended.          a: COVID: Up-to-date         b: Tetanus: Up-to-date         c: Shingrix: Up-to-date         d: RSV: Up-to-date         e: Prevnar: Up-to-date    2. Blood Work:  3. Seen your dentist twice a  year  4. Yearly Eye exam is recommended    5. BMI:   6: Diet recommendations:   Eat Clean, Try to have as many home cooked meals as possible  Avoid processed foods which contain excess calories, sugar, and sodium.    7. Exercise recommendations:   150 minutes a week to maintain your weight     If you have to loose weight, you need a better diet and exercise plan.     8. Please get your Living will / Advance directive completed if you do not have one already. Please make sure our office has a copy of the latest one.     9. Colonoscopy: Uptodate, repeat in   10. PSA:    11.     Follow up in one year for a Physical. Please call the office before your Physical to see if you need blood work completed prior to your physical.     Please call me if any questions arise from now until your next visit. I will call you after I am done seeing patients. A Doctor is always available by phone when the office is closed. Please feel free to call for help with any problem that you feel shouldn't wait until the office re-opens.     Irina Michel MD    Vitamin D levels); Future  Chronic right shoulder pain  -     XR shoulder right 2+ views; Future        Dear Fredy Davidson     It was my pleasure to take care of you today in the office. Below are the things we discussed today:    1. Immunizations: Yearly Flu shot is recommended. Up-to-date.         a: COVID: Please get booster from the pharmacy         b: Tetanus: Up-to-date         c: Shingrix: Up-to-date         d: Prevnar: Up-to-date    2. Blood Work: Reviewed    3. Seen your dentist twice a year  4. Yearly Eye exam is recommended    5. BMI: Obese   6: Diet recommendations:   Eat Clean, Try to have as many home cooked meals as possible  Avoid processed foods which contain excess calories, sugar, and sodium.    7. Exercise recommendations:   150 minutes a week to maintain your weight     If you have to lose weight, you need a better diet and exercise plan.     8. Please get your Living will / Advance directive completed if you do not have one already. Please make sure our office has a copy of the latest one.     9. Colonoscopy: Uptodate. Last done in May 2022, repeat in May 2027  10. PSA: Up-to-date     11. Hyperlipidemia: Continue atorvastatin, take 4-5 times weekly.     12. Hypertension: Well- controlled. Continue hydrochlorothiazide.     13. GERD: Continue pantoprazole, use as needed.     14. Hypokalemia: Continue potassium supplements.     15. PSA elevation: Referred to Urology.    16. Chronic right shoulder: X-ray ordered.      Follow up in 6 months.    Follow up in one year for a Physical. Please call the office before your Physical to see if you need blood work completed prior to your physical.     Please call me if any questions arise from now until your next visit. I will call you after I am done seeing patients. A Doctor is always available by phone when the office is closed. Please feel free to call for help with any problem that you feel shouldn't wait until the office re-opens.   Scribe  Attestation  By signing my name below, I, Rabia Walsh   attest that this documentation has been prepared under the direction and in the presence of Irina Michel MD.

## 2025-02-24 ENCOUNTER — APPOINTMENT (OUTPATIENT)
Dept: PRIMARY CARE | Facility: CLINIC | Age: 70
End: 2025-02-24
Payer: COMMERCIAL

## 2025-02-24 VITALS
SYSTOLIC BLOOD PRESSURE: 154 MMHG | BODY MASS INDEX: 37.31 KG/M2 | WEIGHT: 246.2 LBS | OXYGEN SATURATION: 93 % | HEIGHT: 68 IN | DIASTOLIC BLOOD PRESSURE: 89 MMHG | HEART RATE: 66 BPM

## 2025-02-24 DIAGNOSIS — K20.90 ESOPHAGITIS: ICD-10-CM

## 2025-02-24 DIAGNOSIS — Z00.00 ANNUAL PHYSICAL EXAM: Primary | ICD-10-CM

## 2025-02-24 DIAGNOSIS — G89.29 CHRONIC RIGHT SHOULDER PAIN: ICD-10-CM

## 2025-02-24 DIAGNOSIS — R97.20 PSA ELEVATION: ICD-10-CM

## 2025-02-24 DIAGNOSIS — E66.812 CLASS 2 SEVERE OBESITY DUE TO EXCESS CALORIES WITH SERIOUS COMORBIDITY AND BODY MASS INDEX (BMI) OF 37.0 TO 37.9 IN ADULT: ICD-10-CM

## 2025-02-24 DIAGNOSIS — I10 BENIGN ESSENTIAL HTN: ICD-10-CM

## 2025-02-24 DIAGNOSIS — E87.6 HYPOKALEMIA: ICD-10-CM

## 2025-02-24 DIAGNOSIS — M25.511 CHRONIC RIGHT SHOULDER PAIN: ICD-10-CM

## 2025-02-24 DIAGNOSIS — E78.00 PURE HYPERCHOLESTEROLEMIA: ICD-10-CM

## 2025-02-24 DIAGNOSIS — E55.9 VITAMIN D DEFICIENCY: ICD-10-CM

## 2025-02-24 DIAGNOSIS — E66.01 CLASS 2 SEVERE OBESITY DUE TO EXCESS CALORIES WITH SERIOUS COMORBIDITY AND BODY MASS INDEX (BMI) OF 37.0 TO 37.9 IN ADULT: ICD-10-CM

## 2025-02-24 PROCEDURE — 1123F ACP DISCUSS/DSCN MKR DOCD: CPT | Performed by: FAMILY MEDICINE

## 2025-02-24 PROCEDURE — 99397 PER PM REEVAL EST PAT 65+ YR: CPT | Performed by: FAMILY MEDICINE

## 2025-02-24 PROCEDURE — 1036F TOBACCO NON-USER: CPT | Performed by: FAMILY MEDICINE

## 2025-02-24 PROCEDURE — 1159F MED LIST DOCD IN RCRD: CPT | Performed by: FAMILY MEDICINE

## 2025-02-24 PROCEDURE — 1160F RVW MEDS BY RX/DR IN RCRD: CPT | Performed by: FAMILY MEDICINE

## 2025-02-24 PROCEDURE — 3077F SYST BP >= 140 MM HG: CPT | Performed by: FAMILY MEDICINE

## 2025-02-24 PROCEDURE — 3079F DIAST BP 80-89 MM HG: CPT | Performed by: FAMILY MEDICINE

## 2025-02-24 PROCEDURE — 3008F BODY MASS INDEX DOCD: CPT | Performed by: FAMILY MEDICINE

## 2025-02-24 RX ORDER — HYDROCHLOROTHIAZIDE 25 MG/1
25 TABLET ORAL DAILY
Qty: 90 TABLET | Refills: 1 | Status: SHIPPED | OUTPATIENT
Start: 2025-02-24

## 2025-02-24 RX ORDER — ASCORBIC ACID 250 MG
250 TABLET ORAL DAILY
COMMUNITY

## 2025-02-24 RX ORDER — ATORVASTATIN CALCIUM 20 MG/1
20 TABLET, FILM COATED ORAL
Qty: 65 TABLET | Refills: 1 | Status: SHIPPED | OUTPATIENT
Start: 2025-02-24

## 2025-02-24 RX ORDER — POTASSIUM CHLORIDE 20 MEQ/1
20 TABLET, EXTENDED RELEASE ORAL DAILY
Qty: 90 TABLET | Refills: 1 | Status: SHIPPED | OUTPATIENT
Start: 2025-02-24

## 2025-02-24 RX ORDER — PANTOPRAZOLE SODIUM 40 MG/1
40 TABLET, DELAYED RELEASE ORAL
Qty: 90 TABLET | Refills: 1 | Status: SHIPPED | OUTPATIENT
Start: 2025-02-24

## 2025-02-24 ASSESSMENT — COLUMBIA-SUICIDE SEVERITY RATING SCALE - C-SSRS
1. IN THE PAST MONTH, HAVE YOU WISHED YOU WERE DEAD OR WISHED YOU COULD GO TO SLEEP AND NOT WAKE UP?: NO
2. HAVE YOU ACTUALLY HAD ANY THOUGHTS OF KILLING YOURSELF?: NO

## 2025-02-24 ASSESSMENT — PATIENT HEALTH QUESTIONNAIRE - PHQ9
2. FEELING DOWN, DEPRESSED OR HOPELESS: NOT AT ALL
1. LITTLE INTEREST OR PLEASURE IN DOING THINGS: NOT AT ALL
SUM OF ALL RESPONSES TO PHQ9 QUESTIONS 1 AND 2: 0

## 2025-03-06 ENCOUNTER — PATIENT MESSAGE (OUTPATIENT)
Dept: PRIMARY CARE | Facility: CLINIC | Age: 70
End: 2025-03-06

## 2025-03-06 ENCOUNTER — TELEMEDICINE (OUTPATIENT)
Dept: PRIMARY CARE | Facility: CLINIC | Age: 70
End: 2025-03-06
Payer: COMMERCIAL

## 2025-03-06 DIAGNOSIS — R05.1 ACUTE COUGH: Primary | ICD-10-CM

## 2025-03-06 DIAGNOSIS — R05.1 ACUTE COUGH: ICD-10-CM

## 2025-03-06 DIAGNOSIS — B34.9 VIRAL SYNDROME: Primary | ICD-10-CM

## 2025-03-06 PROCEDURE — 1160F RVW MEDS BY RX/DR IN RCRD: CPT | Performed by: FAMILY MEDICINE

## 2025-03-06 PROCEDURE — 99213 OFFICE O/P EST LOW 20 MIN: CPT | Performed by: FAMILY MEDICINE

## 2025-03-06 PROCEDURE — 1159F MED LIST DOCD IN RCRD: CPT | Performed by: FAMILY MEDICINE

## 2025-03-06 PROCEDURE — 1123F ACP DISCUSS/DSCN MKR DOCD: CPT | Performed by: FAMILY MEDICINE

## 2025-03-06 RX ORDER — PROMETHAZINE HYDROCHLORIDE AND DEXTROMETHORPHAN HYDROBROMIDE 6.25; 15 MG/5ML; MG/5ML
5 SYRUP ORAL 4 TIMES DAILY PRN
Qty: 118 ML | Refills: 0 | Status: SHIPPED | OUTPATIENT
Start: 2025-03-06

## 2025-03-06 RX ORDER — METHYLPREDNISOLONE 4 MG/1
TABLET ORAL
Qty: 21 TABLET | Refills: 0 | Status: SHIPPED | OUTPATIENT
Start: 2025-03-06 | End: 2025-03-12

## 2025-03-06 RX ORDER — ALBUTEROL SULFATE 90 UG/1
2 INHALANT RESPIRATORY (INHALATION) EVERY 4 HOURS PRN
Qty: 8.5 G | Refills: 0 | Status: SHIPPED | OUTPATIENT
Start: 2025-03-06 | End: 2026-03-06

## 2025-03-06 NOTE — PROGRESS NOTES
Subjective   Patient ID: Fredy Davidson is a 69 y.o. male who presents for an EPV visit.    HPI   The patient reports that he is taking atorvastatin for hyperlipemia, however, he does not take it daily due to leg cramps as a side effect. His blood pressures are well- controlled on the current dose of hydrochlorothiazide and is also taking pantoprazole as needed for GERD, vitamin C, probiotic and potassium supplements for hypokalemia as prescribed.       Today he presents with concerns over cold symptoms and fever of 101 which started last Sunday. He has been feeling congestion today and is wheezing a lot.  He tested negative for Covid.         Review of Systems:  Constitutional: + fever or chills  Cardiovascular: no chest pain, no palpitations and no syncope.   Respiratory: no cough, no shortness of breath during exertion and no shortness of breath at rest. + wheezing + congestion  Gastrointestinal: no abdominal pain, no nausea and no vomiting.  Neuro: No Headache, no dizziness    Physical Exam:   Constitutional: Alert and in no acute distress. Well developed, well nourished.   Head and Face: Head and face: Normal.     Eyes: Normal external exam.    Ears, Nose, Mouth, and Throat: External inspection of ears and nose: Normal.  Hearing: Normal.   Neck: No neck mass was observed. Supple.  Pulmonary: No respiratory distress.    Musculoskeletal: Range of motion: Normal.     Skin: Normal skin color and pigmentation, normal skin turgor, and no rash.    Neurologic: Coordination: Normal.    Psychiatric: Judgment and insight: Intact. Mood and affect: Normal.    Lab Results   Component Value Date    WBC 7.4 02/20/2025    HGB 17.3 (H) 02/20/2025    HCT 52.7 (H) 02/20/2025     02/20/2025    CHOL 146 08/06/2024    TRIG 173 (H) 08/06/2024    HDL 37.5 08/06/2024    ALT 15 02/20/2025    AST 19 02/20/2025     02/20/2025    K 3.6 02/20/2025    CL 99 02/20/2025    CREATININE 1.21 02/20/2025    BUN 17 02/20/2025    CO2 32  02/20/2025    TSH 1.52 08/06/2024    PSA 2.51 02/20/2025    HGBA1C 5.6 08/06/2024       Echocardiogram stress test          [facility], [address] [place] [address]             Tel [phone] and Fax [phone]       Exercise Stress Echo    Patient Name:     MARGARITO VALVERDE    Ordering Provider:   [id number] GABINO BARROW  Study Date:       2/5/2024            Reading Physician:   Cristina Douglas MD  MRN/PID:          [id number]            Supervising          [id number] Vincent Douglas                                        Physician:           MD  Accession#:       [id number]        Fellow:  Date of           1955 / 68      Fellow:  Birth/Age:        years  Gender:           M                   Nurse:               Sakina Keller RN  Admit Date:       2/5/2024            Technician:  Admission Status: Outpatient          Sonographer:         EJ ESTEVEZ  Height:           170.18 cm           Technologist:  Weight:           107.96 kg           Additional Staff:  BSA:              2.18 m2             Encounter#:          [id number]  BMI:              37.28 kg/m2         Patient Location[facility]    [facility]    Study Type:    ECHOCARDIOGRAM STRESS TEST  Diagnosis/ICD: Other forms of dyspnea-R06.09  Indication:    Dyspnea on Exertion  CPT Code:      Stress Echo-08537; Stress Test Supervision-82788; Stress Test                 Interpretation-54671    Falls Risk: Low: Patient has a low risk for sustaining a fall; enviromental safety interventions in place.     Study Details: Correct procedure and correct patient verified verbally.       Patient History: Dyspnea, hypertension and hyperlipidemia. sleep apnea.  Allergies: None.  Smoker:    Never.  Diabetes:  No.  BMI:       Obese >30.       Medications: The patient's prescribed medication is potassium, protonix, hydrochlorathiazide, lipitor. The patient took medications as prescribed.      Patient Performance: The patient exercised to stage III on a Eddi protocol for 6 minutes and 31 seconds, achieving 7.7 METS. The peak heart rate achieved was 141 bpm, which was 92 % of the age predicted target heart rate of 154 bpm. The resting blood pressure was 160/96 mmHg with a heart rate of 67 bpm. The patient's functional capacity was average. The patient developed shortness of breath during the stress exam. The symptoms resolved with rest. The blood pressure response was normal. The test was terminated due to: dyspnea, completed lab protocol and MPHR >85%. Patient has met the discharge criteria and is discharged to home.     Baseline ECG: Resting ECG showed normal sinus rhythm with normal tracing.     Stress ECG: Stress ECG showed sinus tachycardia, with occasional PVCs. No significant ST changes.     Stress Stage Data:  +---+------+-------+  HR Sys BPDias BP  +---+------+-------+  67 160   96       +---+------+-------+  [id number]   80       +---+------+-------+  [id number]   82       +---+------+-------+  141               +---+------+-------+       Recovery ECG: Recovery ECG showed normal sinus rhythm, with rare PACs and PVCs.     +------------+---+------+-------+              HR Sys BPDias BP  +------------+---+------+-------+  Recovery I  [id number]   77       +------------+---+------+-------+  Recovery II 92 191   83       +------------+---+------+-------+  Recovery III89 168   89       +------------+---+------+-------+  Recovery IV 89 144   83       +------------+---+------+-------+       Baseline Echo: Global LV systolic function is normal. The resting baseline ejection fraction was estimated at 55 to 60%. There are no regional wall motion abnormalities at baseline. Spectral Doppler shows an impaired relaxation pattern of LV diastolic filling. Normal LV filling pressures.     Stress Echo: There are no stress induced regional wall motion abnormalities. At peak  stress, the global LV systolic function is increased.     Mitral Valve:  Resting  MV E Vmax:  0.67 m/s  Lateral e': 0.10 m/s  Medial e':  0.06 m/s  E/e':       6.90       Summary:   1. The resting ejection fraction was estimated at 55 to 60%.   2. Normal global left ventricular systolic function.   3. The patient's functional capacity was average. The blood pressure response was normal.   4. Spectral Doppler shows an impaired relaxation pattern of LV diastolic filling.   5. Adequate level of stress achieved.   6. No clinical, echocardiographic or electrocardiographic evidence for ischemia at a maximal workload.    [id number] Vincent Douglas MD  Electronically signed on 2/5/2024 at 2:43:03 PM            ** Final **      Assessment/Plan   Assessment & Plan  Acute cough  Take Phenergan -DM for cough and use albuterol as needed for wheezing or shortness of breath.   Orders:    promethazine-DM (Phenergan-DM) 6.25-15 mg/5 mL syrup; Take 5 mL by mouth 4 times a day as needed for cough.    albuterol (ProAir HFA) 90 mcg/actuation inhaler; Inhale 2 puffs every 4 hours if needed for wheezing or shortness of breath.    Viral syndrome  Start taking Medrol Dospak as directed.  Rest and drink plenty of fluids.  Let me know if fever does not reduce within a week.  Orders:    methylPREDNISolone (Medrol Dospak) 4 mg tablets; Take as directed on package.      fOLLOW UP IN 6 months      This Medical recommendation has been made based on the Telephonic/Video conversation and the history is given by the patient, which I as a Physician and the patient also understand that there are limitations given the lack of an in-person Physical Exam. Patient will call back if symptoms don't improve.    Your yearly Physical is due in: fEB 2026  When you call the office for your yearly Physical, please ask them to inform me to order your blood work, so that you can get the fasting blood work before your appointment and we can discuss the results at your  physical.      A Doctor is always available by phone when the office is closed. Please feel free to call for help with any problem that you feel shouldn't wait until the office re-opens.     Scribe Attestation  By signing my name below, IShadia Scribe   attest that this documentation has been prepared under the direction and in the presence of Irina Michel MD.

## 2025-03-07 RX ORDER — DOXYCYCLINE 100 MG/1
100 CAPSULE ORAL 2 TIMES DAILY
Qty: 14 CAPSULE | Refills: 0 | Status: SHIPPED | OUTPATIENT
Start: 2025-03-07 | End: 2025-03-14

## 2025-06-11 ENCOUNTER — PATIENT MESSAGE (OUTPATIENT)
Dept: PRIMARY CARE | Facility: CLINIC | Age: 70
End: 2025-06-11
Payer: COMMERCIAL

## 2025-06-11 DIAGNOSIS — R21 RASH: Primary | ICD-10-CM

## 2025-06-12 RX ORDER — KETOCONAZOLE 20 MG/ML
SHAMPOO, SUSPENSION TOPICAL 2 TIMES WEEKLY
Qty: 120 ML | Refills: 0 | Status: SHIPPED | OUTPATIENT
Start: 2025-06-12

## 2025-07-24 DIAGNOSIS — Z00.00 ANNUAL PHYSICAL EXAM: ICD-10-CM

## 2025-07-24 DIAGNOSIS — E55.9 VITAMIN D DEFICIENCY: ICD-10-CM

## 2025-08-15 LAB
ERYTHROCYTE [DISTWIDTH] IN BLOOD BY AUTOMATED COUNT: 13.1 % (ref 11–15)
HCT VFR BLD AUTO: 50.8 % (ref 38.5–50)
HGB BLD-MCNC: 16.6 G/DL (ref 13.2–17.1)
MCH RBC QN AUTO: 28.4 PG (ref 27–33)
MCHC RBC AUTO-ENTMCNC: 32.7 G/DL (ref 32–36)
MCV RBC AUTO: 87 FL (ref 80–100)
PLATELET # BLD AUTO: 242 THOUSAND/UL (ref 140–400)
PMV BLD REES-ECKER: 10.4 FL (ref 7.5–12.5)
RBC # BLD AUTO: 5.84 MILLION/UL (ref 4.2–5.8)
WBC # BLD AUTO: 7.1 THOUSAND/UL (ref 3.8–10.8)

## 2025-08-16 LAB
25(OH)D3+25(OH)D2 SERPL-MCNC: 34 NG/ML (ref 30–100)
ALBUMIN SERPL-MCNC: 4.1 G/DL (ref 3.6–5.1)
ALP SERPL-CCNC: 66 U/L (ref 35–144)
ALT SERPL-CCNC: 14 U/L (ref 9–46)
ANION GAP SERPL CALCULATED.4IONS-SCNC: 11 MMOL/L (CALC) (ref 7–17)
AST SERPL-CCNC: 16 U/L (ref 10–35)
BILIRUB SERPL-MCNC: 0.7 MG/DL (ref 0.2–1.2)
BUN SERPL-MCNC: 16 MG/DL (ref 7–25)
CALCIUM SERPL-MCNC: 8.5 MG/DL (ref 8.6–10.3)
CHLORIDE SERPL-SCNC: 103 MMOL/L (ref 98–110)
CHOLEST SERPL-MCNC: 161 MG/DL
CHOLEST/HDLC SERPL: 4.4 (CALC)
CO2 SERPL-SCNC: 26 MMOL/L (ref 20–32)
CREAT SERPL-MCNC: 1.24 MG/DL (ref 0.7–1.35)
EGFRCR SERPLBLD CKD-EPI 2021: 64 ML/MIN/1.73M2
EST. AVERAGE GLUCOSE BLD GHB EST-MCNC: 120 MG/DL
EST. AVERAGE GLUCOSE BLD GHB EST-SCNC: 6.6 MMOL/L
GLUCOSE SERPL-MCNC: 108 MG/DL (ref 65–99)
HBA1C MFR BLD: 5.8 %
HDLC SERPL-MCNC: 37 MG/DL
LDLC SERPL CALC-MCNC: 104 MG/DL (CALC)
NONHDLC SERPL-MCNC: 124 MG/DL (CALC)
POTASSIUM SERPL-SCNC: 3.7 MMOL/L (ref 3.5–5.3)
PROT SERPL-MCNC: 6.6 G/DL (ref 6.1–8.1)
SODIUM SERPL-SCNC: 140 MMOL/L (ref 135–146)
TRIGL SERPL-MCNC: 103 MG/DL
TSH SERPL-ACNC: 1.62 MIU/L (ref 0.4–4.5)
VIT B12 SERPL-MCNC: 222 PG/ML (ref 200–1100)

## 2025-08-19 ENCOUNTER — APPOINTMENT (OUTPATIENT)
Dept: PRIMARY CARE | Facility: CLINIC | Age: 70
End: 2025-08-19
Payer: COMMERCIAL

## 2025-08-19 VITALS
WEIGHT: 240 LBS | HEART RATE: 65 BPM | SYSTOLIC BLOOD PRESSURE: 159 MMHG | HEIGHT: 68 IN | BODY MASS INDEX: 36.37 KG/M2 | OXYGEN SATURATION: 96 % | DIASTOLIC BLOOD PRESSURE: 83 MMHG

## 2025-08-19 DIAGNOSIS — E87.6 HYPOKALEMIA: ICD-10-CM

## 2025-08-19 DIAGNOSIS — Z12.5 ENCOUNTER FOR PROSTATE CANCER SCREENING: ICD-10-CM

## 2025-08-19 DIAGNOSIS — K20.90 ESOPHAGITIS: ICD-10-CM

## 2025-08-19 DIAGNOSIS — R21 RASH: ICD-10-CM

## 2025-08-19 DIAGNOSIS — R73.9 ELEVATED BLOOD SUGAR: ICD-10-CM

## 2025-08-19 DIAGNOSIS — E53.8 VITAMIN B12 DEFICIENCY: ICD-10-CM

## 2025-08-19 DIAGNOSIS — I10 BENIGN ESSENTIAL HTN: ICD-10-CM

## 2025-08-19 DIAGNOSIS — E78.00 PURE HYPERCHOLESTEROLEMIA: Primary | ICD-10-CM

## 2025-08-19 PROCEDURE — 1036F TOBACCO NON-USER: CPT | Performed by: FAMILY MEDICINE

## 2025-08-19 PROCEDURE — 1160F RVW MEDS BY RX/DR IN RCRD: CPT | Performed by: FAMILY MEDICINE

## 2025-08-19 PROCEDURE — 3079F DIAST BP 80-89 MM HG: CPT | Performed by: FAMILY MEDICINE

## 2025-08-19 PROCEDURE — 99214 OFFICE O/P EST MOD 30 MIN: CPT | Performed by: FAMILY MEDICINE

## 2025-08-19 PROCEDURE — 1159F MED LIST DOCD IN RCRD: CPT | Performed by: FAMILY MEDICINE

## 2025-08-19 PROCEDURE — 3077F SYST BP >= 140 MM HG: CPT | Performed by: FAMILY MEDICINE

## 2025-08-19 PROCEDURE — 3008F BODY MASS INDEX DOCD: CPT | Performed by: FAMILY MEDICINE

## 2025-08-19 RX ORDER — HYDROCHLOROTHIAZIDE 25 MG/1
25 TABLET ORAL DAILY
Qty: 90 TABLET | Refills: 1 | Status: SHIPPED | OUTPATIENT
Start: 2025-08-19

## 2025-08-19 RX ORDER — KETOCONAZOLE 20 MG/ML
SHAMPOO, SUSPENSION TOPICAL 2 TIMES WEEKLY
Qty: 120 ML | Refills: 0 | Status: SHIPPED | OUTPATIENT
Start: 2025-08-21

## 2025-08-19 RX ORDER — PANTOPRAZOLE SODIUM 40 MG/1
40 TABLET, DELAYED RELEASE ORAL
Qty: 90 TABLET | Refills: 1 | Status: SHIPPED | OUTPATIENT
Start: 2025-08-19

## 2025-08-19 RX ORDER — ATORVASTATIN CALCIUM 20 MG/1
20 TABLET, FILM COATED ORAL
Qty: 65 TABLET | Refills: 1 | Status: SHIPPED | OUTPATIENT
Start: 2025-08-19

## 2025-08-19 RX ORDER — POTASSIUM CHLORIDE 20 MEQ/1
20 TABLET, EXTENDED RELEASE ORAL DAILY
Qty: 90 TABLET | Refills: 1 | Status: SHIPPED | OUTPATIENT
Start: 2025-08-19

## 2025-09-18 ENCOUNTER — APPOINTMENT (OUTPATIENT)
Dept: PRIMARY CARE | Facility: CLINIC | Age: 70
End: 2025-09-18
Payer: COMMERCIAL

## 2026-02-20 ENCOUNTER — APPOINTMENT (OUTPATIENT)
Dept: PRIMARY CARE | Facility: CLINIC | Age: 71
End: 2026-02-20
Payer: COMMERCIAL